# Patient Record
Sex: FEMALE | Race: BLACK OR AFRICAN AMERICAN | NOT HISPANIC OR LATINO | URBAN - METROPOLITAN AREA
[De-identification: names, ages, dates, MRNs, and addresses within clinical notes are randomized per-mention and may not be internally consistent; named-entity substitution may affect disease eponyms.]

---

## 2019-01-16 ENCOUNTER — INPATIENT (INPATIENT)
Facility: HOSPITAL | Age: 39
LOS: 8 days | Discharge: HOME | End: 2019-01-25
Attending: INTERNAL MEDICINE | Admitting: INTERNAL MEDICINE
Payer: COMMERCIAL

## 2019-01-16 VITALS
SYSTOLIC BLOOD PRESSURE: 108 MMHG | HEART RATE: 91 BPM | TEMPERATURE: 98 F | OXYGEN SATURATION: 99 % | WEIGHT: 121.92 LBS | HEIGHT: 64 IN | RESPIRATION RATE: 19 BRPM | DIASTOLIC BLOOD PRESSURE: 63 MMHG

## 2019-01-16 DIAGNOSIS — J15.6 PNEUMONIA DUE TO OTHER GRAM-NEGATIVE BACTERIA: ICD-10-CM

## 2019-01-16 DIAGNOSIS — D72.820 LYMPHOCYTOSIS (SYMPTOMATIC): ICD-10-CM

## 2019-01-16 DIAGNOSIS — J18.9 PNEUMONIA, UNSPECIFIED ORGANISM: ICD-10-CM

## 2019-01-16 DIAGNOSIS — A41.59 OTHER GRAM-NEGATIVE SEPSIS: ICD-10-CM

## 2019-01-16 DIAGNOSIS — D72.829 ELEVATED WHITE BLOOD CELL COUNT, UNSPECIFIED: ICD-10-CM

## 2019-01-16 DIAGNOSIS — D47.9 NEOPLASM OF UNCERTAIN BEHAVIOR OF LYMPHOID, HEMATOPOIETIC AND RELATED TISSUE, UNSPECIFIED: ICD-10-CM

## 2019-01-16 DIAGNOSIS — R19.7 DIARRHEA, UNSPECIFIED: ICD-10-CM

## 2019-01-16 LAB
ALBUMIN SERPL ELPH-MCNC: 2.7 G/DL — LOW (ref 3.3–5)
ALP SERPL-CCNC: 109 U/L — SIGNIFICANT CHANGE UP (ref 40–120)
ALT FLD-CCNC: 27 U/L — SIGNIFICANT CHANGE UP (ref 12–78)
ANION GAP SERPL CALC-SCNC: 8 MMOL/L — SIGNIFICANT CHANGE UP (ref 5–17)
ANISOCYTOSIS BLD QL: SLIGHT — SIGNIFICANT CHANGE UP
APTT BLD: 30.6 SEC — SIGNIFICANT CHANGE UP (ref 28.5–37)
AST SERPL-CCNC: 42 U/L — HIGH (ref 15–37)
BASOPHILS # BLD AUTO: 0 K/UL — SIGNIFICANT CHANGE UP (ref 0–0.2)
BASOPHILS NFR BLD AUTO: 0 % — SIGNIFICANT CHANGE UP (ref 0–2)
BILIRUB SERPL-MCNC: 0.2 MG/DL — SIGNIFICANT CHANGE UP (ref 0.2–1.2)
BUN SERPL-MCNC: 15 MG/DL — SIGNIFICANT CHANGE UP (ref 7–23)
CALCIUM SERPL-MCNC: 8.8 MG/DL — SIGNIFICANT CHANGE UP (ref 8.5–10.1)
CHLORIDE SERPL-SCNC: 107 MMOL/L — SIGNIFICANT CHANGE UP (ref 96–108)
CK MB CFR SERPL CALC: 3 NG/ML — SIGNIFICANT CHANGE UP (ref 0.5–3.6)
CO2 SERPL-SCNC: 26 MMOL/L — SIGNIFICANT CHANGE UP (ref 22–31)
CREAT SERPL-MCNC: 0.91 MG/DL — SIGNIFICANT CHANGE UP (ref 0.5–1.3)
D DIMER BLD IA.RAPID-MCNC: <150 NG/ML DDU — SIGNIFICANT CHANGE UP
EOSINOPHIL # BLD AUTO: 1.01 K/UL — HIGH (ref 0–0.5)
EOSINOPHIL NFR BLD AUTO: 4 % — SIGNIFICANT CHANGE UP (ref 0–6)
FLU A RESULT: SIGNIFICANT CHANGE UP
FLU A RESULT: SIGNIFICANT CHANGE UP
FLUAV AG NPH QL: SIGNIFICANT CHANGE UP
FLUBV AG NPH QL: SIGNIFICANT CHANGE UP
GLUCOSE SERPL-MCNC: 79 MG/DL — SIGNIFICANT CHANGE UP (ref 70–99)
HCG SERPL-ACNC: <1 MIU/ML — SIGNIFICANT CHANGE UP
HCT VFR BLD CALC: 40 % — SIGNIFICANT CHANGE UP (ref 34.5–45)
HGB BLD-MCNC: 13.1 G/DL — SIGNIFICANT CHANGE UP (ref 11.5–15.5)
INR BLD: 1.18 RATIO — HIGH (ref 0.88–1.16)
LACTATE SERPL-SCNC: 0.7 MMOL/L — SIGNIFICANT CHANGE UP (ref 0.7–2)
LG PLATELETS BLD QL AUTO: SLIGHT — SIGNIFICANT CHANGE UP
LYMPHOCYTES # BLD AUTO: 16.94 K/UL — HIGH (ref 1–3.3)
LYMPHOCYTES # BLD AUTO: 67 % — HIGH (ref 13–44)
MACROCYTES BLD QL: SLIGHT — SIGNIFICANT CHANGE UP
MANUAL DIF COMMENT BLD-IMP: SIGNIFICANT CHANGE UP
MANUAL SMEAR VERIFICATION: SIGNIFICANT CHANGE UP
MCHC RBC-ENTMCNC: 28.1 PG — SIGNIFICANT CHANGE UP (ref 27–34)
MCHC RBC-ENTMCNC: 32.8 GM/DL — SIGNIFICANT CHANGE UP (ref 32–36)
MCV RBC AUTO: 85.8 FL — SIGNIFICANT CHANGE UP (ref 80–100)
MICROCYTES BLD QL: SLIGHT — SIGNIFICANT CHANGE UP
MONOCYTES # BLD AUTO: 1.26 K/UL — HIGH (ref 0–0.9)
MONOCYTES NFR BLD AUTO: 5 % — SIGNIFICANT CHANGE UP (ref 2–14)
NEUTROPHILS # BLD AUTO: 4.55 K/UL — SIGNIFICANT CHANGE UP (ref 1.8–7.4)
NEUTROPHILS NFR BLD AUTO: 18 % — LOW (ref 43–77)
NRBC # BLD: 0 /100 — SIGNIFICANT CHANGE UP (ref 0–0)
NRBC # BLD: SIGNIFICANT CHANGE UP /100 WBCS (ref 0–0)
NT-PROBNP SERPL-SCNC: 23 PG/ML — SIGNIFICANT CHANGE UP (ref 0–125)
OVALOCYTES BLD QL SMEAR: SLIGHT — SIGNIFICANT CHANGE UP
PLAT MORPH BLD: NORMAL — SIGNIFICANT CHANGE UP
PLATELET # BLD AUTO: 424 K/UL — HIGH (ref 150–400)
POLYCHROMASIA BLD QL SMEAR: SLIGHT — SIGNIFICANT CHANGE UP
POTASSIUM SERPL-MCNC: 3.9 MMOL/L — SIGNIFICANT CHANGE UP (ref 3.5–5.3)
POTASSIUM SERPL-SCNC: 3.9 MMOL/L — SIGNIFICANT CHANGE UP (ref 3.5–5.3)
PROT SERPL-MCNC: 7.5 GM/DL — SIGNIFICANT CHANGE UP (ref 6–8.3)
PROTHROM AB SERPL-ACNC: 13.3 SEC — HIGH (ref 10–12.9)
RBC # BLD: 4.66 M/UL — SIGNIFICANT CHANGE UP (ref 3.8–5.2)
RBC # FLD: 11.9 % — SIGNIFICANT CHANGE UP (ref 10.3–14.5)
RBC BLD AUTO: ABNORMAL
RSV RESULT: SIGNIFICANT CHANGE UP
RSV RNA RESP QL NAA+PROBE: SIGNIFICANT CHANGE UP
SMUDGE CELLS # BLD: PRESENT — SIGNIFICANT CHANGE UP
SODIUM SERPL-SCNC: 141 MMOL/L — SIGNIFICANT CHANGE UP (ref 135–145)
TROPONIN I SERPL-MCNC: <.015 NG/ML — SIGNIFICANT CHANGE UP (ref 0.01–0.04)
VARIANT LYMPHS # BLD: 6 % — SIGNIFICANT CHANGE UP (ref 0–6)
WBC # BLD: 25.28 K/UL — HIGH (ref 3.8–10.5)
WBC # FLD AUTO: 25.28 K/UL — HIGH (ref 3.8–10.5)

## 2019-01-16 PROCEDURE — 99223 1ST HOSP IP/OBS HIGH 75: CPT

## 2019-01-16 PROCEDURE — 99285 EMERGENCY DEPT VISIT HI MDM: CPT

## 2019-01-16 PROCEDURE — 88112 CYTOPATH CELL ENHANCE TECH: CPT | Mod: 26

## 2019-01-16 PROCEDURE — 71275 CT ANGIOGRAPHY CHEST: CPT | Mod: 26

## 2019-01-16 PROCEDURE — 93010 ELECTROCARDIOGRAM REPORT: CPT

## 2019-01-16 PROCEDURE — 71045 X-RAY EXAM CHEST 1 VIEW: CPT | Mod: 26

## 2019-01-16 RX ORDER — SODIUM CHLORIDE 9 MG/ML
2000 INJECTION INTRAMUSCULAR; INTRAVENOUS; SUBCUTANEOUS ONCE
Qty: 0 | Refills: 0 | Status: COMPLETED | OUTPATIENT
Start: 2019-01-16 | End: 2019-01-16

## 2019-01-16 RX ORDER — PIPERACILLIN AND TAZOBACTAM 4; .5 G/20ML; G/20ML
3.38 INJECTION, POWDER, LYOPHILIZED, FOR SOLUTION INTRAVENOUS ONCE
Qty: 0 | Refills: 0 | Status: COMPLETED | OUTPATIENT
Start: 2019-01-16 | End: 2019-01-16

## 2019-01-16 RX ORDER — VANCOMYCIN HCL 1 G
1000 VIAL (EA) INTRAVENOUS ONCE
Qty: 0 | Refills: 0 | Status: COMPLETED | OUTPATIENT
Start: 2019-01-16 | End: 2019-01-16

## 2019-01-16 RX ORDER — PIPERACILLIN AND TAZOBACTAM 4; .5 G/20ML; G/20ML
3.38 INJECTION, POWDER, LYOPHILIZED, FOR SOLUTION INTRAVENOUS EVERY 8 HOURS
Qty: 0 | Refills: 0 | Status: DISCONTINUED | OUTPATIENT
Start: 2019-01-16 | End: 2019-01-17

## 2019-01-16 RX ORDER — ACETAMINOPHEN 500 MG
650 TABLET ORAL EVERY 6 HOURS
Qty: 0 | Refills: 0 | Status: DISCONTINUED | OUTPATIENT
Start: 2019-01-16 | End: 2019-01-23

## 2019-01-16 RX ADMIN — Medication 250 MILLIGRAM(S): at 17:12

## 2019-01-16 RX ADMIN — Medication 650 MILLIGRAM(S): at 21:19

## 2019-01-16 RX ADMIN — SODIUM CHLORIDE 2000 MILLILITER(S): 9 INJECTION INTRAMUSCULAR; INTRAVENOUS; SUBCUTANEOUS at 12:20

## 2019-01-16 RX ADMIN — Medication 650 MILLIGRAM(S): at 20:19

## 2019-01-16 RX ADMIN — SODIUM CHLORIDE 2000 MILLILITER(S): 9 INJECTION INTRAMUSCULAR; INTRAVENOUS; SUBCUTANEOUS at 16:31

## 2019-01-16 RX ADMIN — PIPERACILLIN AND TAZOBACTAM 25 GRAM(S): 4; .5 INJECTION, POWDER, LYOPHILIZED, FOR SOLUTION INTRAVENOUS at 21:29

## 2019-01-16 RX ADMIN — PIPERACILLIN AND TAZOBACTAM 200 GRAM(S): 4; .5 INJECTION, POWDER, LYOPHILIZED, FOR SOLUTION INTRAVENOUS at 16:30

## 2019-01-16 NOTE — ED PROVIDER NOTE - PROGRESS NOTE DETAILS
Pt is alert and oriented x 3 ambulate with normal gaits without assists. Dr. Howard is notified and admit pt.

## 2019-01-16 NOTE — H&P ADULT - PROBLEM SELECTOR PROBLEM 1
Pneumonia of both lungs due to infectious organism, unspecified part of lung Pneumonia of both lungs due to other aerobic gram-negative bacteria

## 2019-01-16 NOTE — ED PROVIDER NOTE - OBJECTIVE STATEMENT
38 years old female walked in c/o sob, dry cough bilateral chest pain and headache for two weeks. Pt denies recent long traveling or trauma, hx of taking hormone therapy, dizziness, blurred visions, light sensitivities, neck/back pain, focal/distal weakness or numbness, nausea, vomiting, fever, chills, abd pain, dysuria, hematuria, vaginal spotting or discharge or irregular bowel movements. Pt sts she is not at risk of being pregnant.

## 2019-01-16 NOTE — ED ADULT TRIAGE NOTE - CHIEF COMPLAINT QUOTE
patient c/o of difficulty breathing and chest pain , started 2 weeks ago , patient c/o of headache , denied dizziness , denied weakness , denied back pain

## 2019-01-16 NOTE — H&P ADULT - ASSESSMENT
38M WITH NO PMH with pneumonia     IMPROVE VTE Individual Risk Assessment          RISK                                                          Points  [  ] Previous VTE                                                3  [  ] Thrombophilia                                             2  [  ] Lower limb paralysis                                   2        (unable to hold up >15 seconds)    [  ] Current Cancer                                             2         (within 6 months)  [  ] Immobilization > 24 hrs                              1  [  ] ICU/CCU stay > 24 hours                             1  [  ] Age > 60                                                         1    IMPROVE VTE Score:         [         0]    Total Risk Factor Score:    0 - 1:   Consider IPC  >2 - 3:  Thromboprophylaxis required (enoxaparin or SQ heparin)        >4:   High Risk: Thromboprophylaxis required (enoxaparin or SQ heparin), optional add IPC  **If CONTRAINDICATION to enoxaparin or SQ heparin, USE IPCs**

## 2019-01-16 NOTE — H&P ADULT - NSHPLABSRESULTS_GEN_ALL_CORE
13.1   25.28 )-----------( 424      ( 16 Jan 2019 12:47 )             40.0   01-16    141  |  107  |  15  ----------------------------<  79  3.9   |  26  |  0.91    Ca    8.8      16 Jan 2019 12:47    TPro  7.5  /  Alb  2.7<L>  /  TBili  0.2  /  DBili  x   /  AST  42<H>  /  ALT  27  /  AlkPhos  109  01-16  < from: CT Angio Chest w/ IV Cont (01.16.19 @ 14:10) >      Patchy airspace opacifications inboth lungs may represent pneumonia.   Clinical correction is recommended.    Nonspecific mildly enlarged right hilar lymph node.    < from: Xray Chest 1 View AP/PA. (01.16.19 @ 11:51) >    FINDINGS:        Lungs: The lungs are clear.  Heart: The heart is normal in size.  Mediastinum: The mediastinum is within normal limits.    IMPRESSION:    Clear lungs.    < end of copied text >

## 2019-01-16 NOTE — H&P ADULT - NSHPPHYSICALEXAM_GEN_ALL_CORE
ICU Vital Signs Last 24 Hrs  T(C): 37.1 (16 Jan 2019 11:44), Max: 37.1 (16 Jan 2019 11:44)  T(F): 98.7 (16 Jan 2019 11:44), Max: 98.7 (16 Jan 2019 11:44)  HR: 98 (16 Jan 2019 11:44) (91 - 98)  BP: 113/72 (16 Jan 2019 11:44) (108/63 - 113/72)  BP(mean): --  ABP: --  ABP(mean): --  RR: 18 (16 Jan 2019 11:44) (18 - 19)  SpO2: 99% (16 Jan 2019 11:44) (99% - 99%)      GENERAL: NAD well-developed  HEAD:  Atraumatic, Normocephalic  EYES: EOMI, PERRLA, conjunctiva and sclera clear  ENMT: No tonsillar erythema, exudates, or enlargement; Moist mucous membranes, Good dentition, No lesions  NECK: Supple, No JVD, Normal thyroid  NERVOUS SYSTEM:  Alert & Oriented X3, Good concentration; Motor Strength 5/5 B/L upper and lower extremities; DTRs 2+ intact and symmetric  CHEST/LUNG: Clear to percussion bilaterally; No rales, rhonchi, wheezing, or rubs  HEART: Regular rate and rhythm; No murmurs, rubs, or gallops  ABDOMEN: Soft, Nontender, Nondistended; Bowel sounds present  EXTREMITIES:  2+ Peripheral Pulses, No clubbing, cyanosis, or edema  LYMPH: No lymphadenopathy   SKIN: No rashes or lesions

## 2019-01-16 NOTE — ED PROVIDER NOTE - CONSTITUTIONAL, MLM
normal... Well appearing, well nourished, awake, alert, oriented to person, place, time/situation and in no apparent distress. Speaking in clear full sentences no nasal flaring no shoulders retractions no diaphoresis, not holding her head/chest/abdomen, appears comfortable sitting up in the stretcher in a bright light room.

## 2019-01-16 NOTE — H&P ADULT - HISTORY OF PRESENT ILLNESS
38 years old female walked in c/o sob, dry cough bilateral chest pain and headache for two weeks. Pt denies recent long traveling or trauma, hx of taking hormone therapy, dizziness, blurred visions, light sensitivities, neck/back pain, focal/distal weakness or numbness, nausea, vomiting, fever, chills, abd pain, dysuria, hematuria, vaginal spotting or discharge or irregular bowel movements. Pt sts she is not at risk of being pregnant. 38 years old female walked in c/o sob, dry cough bilateral chest pain and headache for two weeks patient cough is nonproducticve and with asome pleuritic chest pain and short of breath - patient went to urgent center and they gave her naproxen  Pt denies recent long traveling or trauma, hx of taking hormone therapy, dizziness, blurred visions, light sensitivities, neck/back pain, focal/distal weakness or numbness, nausea, vomiting,, chills, abd pain, dysuria, hematuria, vaginal spotting or discharge or irregular bowel movements. Pt sts she is not at risk of being pregnant.

## 2019-01-16 NOTE — ED ADULT NURSE NOTE - OBJECTIVE STATEMENT
Pt is a 38YOF who is here for pain in her chest. Pt is not in any distress, pt states the pain started about 2 weeks ago and has only gotten worse. Pt states that she has a problem inhaling when she tries to breathe deep

## 2019-01-17 LAB
ANION GAP SERPL CALC-SCNC: 10 MMOL/L — SIGNIFICANT CHANGE UP (ref 5–17)
BUN SERPL-MCNC: 11 MG/DL — SIGNIFICANT CHANGE UP (ref 7–23)
CALCIUM SERPL-MCNC: 8 MG/DL — LOW (ref 8.5–10.1)
CHLORIDE SERPL-SCNC: 110 MMOL/L — HIGH (ref 96–108)
CO2 SERPL-SCNC: 23 MMOL/L — SIGNIFICANT CHANGE UP (ref 22–31)
CREAT SERPL-MCNC: 1.08 MG/DL — SIGNIFICANT CHANGE UP (ref 0.5–1.3)
GLUCOSE SERPL-MCNC: 94 MG/DL — SIGNIFICANT CHANGE UP (ref 70–99)
HCT VFR BLD CALC: 38 % — SIGNIFICANT CHANGE UP (ref 34.5–45)
HGB BLD-MCNC: 12.3 G/DL — SIGNIFICANT CHANGE UP (ref 11.5–15.5)
MCHC RBC-ENTMCNC: 28 PG — SIGNIFICANT CHANGE UP (ref 27–34)
MCHC RBC-ENTMCNC: 32.4 GM/DL — SIGNIFICANT CHANGE UP (ref 32–36)
MCV RBC AUTO: 86.4 FL — SIGNIFICANT CHANGE UP (ref 80–100)
NRBC # BLD: 0 /100 WBCS — SIGNIFICANT CHANGE UP (ref 0–0)
PLATELET # BLD AUTO: 374 K/UL — SIGNIFICANT CHANGE UP (ref 150–400)
POTASSIUM SERPL-MCNC: 3.8 MMOL/L — SIGNIFICANT CHANGE UP (ref 3.5–5.3)
POTASSIUM SERPL-SCNC: 3.8 MMOL/L — SIGNIFICANT CHANGE UP (ref 3.5–5.3)
RBC # BLD: 4.4 M/UL — SIGNIFICANT CHANGE UP (ref 3.8–5.2)
RBC # FLD: 11.8 % — SIGNIFICANT CHANGE UP (ref 10.3–14.5)
SODIUM SERPL-SCNC: 143 MMOL/L — SIGNIFICANT CHANGE UP (ref 135–145)
WBC # BLD: 19.72 K/UL — HIGH (ref 3.8–10.5)
WBC # FLD AUTO: 19.72 K/UL — HIGH (ref 3.8–10.5)

## 2019-01-17 PROCEDURE — 99233 SBSQ HOSP IP/OBS HIGH 50: CPT

## 2019-01-17 PROCEDURE — 99253 IP/OBS CNSLTJ NEW/EST LOW 45: CPT

## 2019-01-17 RX ORDER — CEFEPIME 1 G/1
2000 INJECTION, POWDER, FOR SOLUTION INTRAMUSCULAR; INTRAVENOUS EVERY 8 HOURS
Qty: 0 | Refills: 0 | Status: DISCONTINUED | OUTPATIENT
Start: 2019-01-17 | End: 2019-01-23

## 2019-01-17 RX ORDER — AZITHROMYCIN 500 MG/1
500 TABLET, FILM COATED ORAL DAILY
Qty: 0 | Refills: 0 | Status: DISCONTINUED | OUTPATIENT
Start: 2019-01-17 | End: 2019-01-23

## 2019-01-17 RX ORDER — ENOXAPARIN SODIUM 100 MG/ML
40 INJECTION SUBCUTANEOUS DAILY
Qty: 0 | Refills: 0 | Status: DISCONTINUED | OUTPATIENT
Start: 2019-01-17 | End: 2019-01-23

## 2019-01-17 RX ADMIN — Medication 650 MILLIGRAM(S): at 16:33

## 2019-01-17 RX ADMIN — CEFEPIME 100 MILLIGRAM(S): 1 INJECTION, POWDER, FOR SOLUTION INTRAMUSCULAR; INTRAVENOUS at 22:25

## 2019-01-17 RX ADMIN — PIPERACILLIN AND TAZOBACTAM 25 GRAM(S): 4; .5 INJECTION, POWDER, LYOPHILIZED, FOR SOLUTION INTRAVENOUS at 05:49

## 2019-01-17 RX ADMIN — ENOXAPARIN SODIUM 40 MILLIGRAM(S): 100 INJECTION SUBCUTANEOUS at 12:05

## 2019-01-17 RX ADMIN — AZITHROMYCIN 500 MILLIGRAM(S): 500 TABLET, FILM COATED ORAL at 15:18

## 2019-01-17 RX ADMIN — Medication 650 MILLIGRAM(S): at 15:36

## 2019-01-17 RX ADMIN — CEFEPIME 100 MILLIGRAM(S): 1 INJECTION, POWDER, FOR SOLUTION INTRAMUSCULAR; INTRAVENOUS at 15:17

## 2019-01-17 NOTE — PROGRESS NOTE ADULT - SUBJECTIVE AND OBJECTIVE BOX
Patient is a 38y old  Female who presents with a chief complaint of cold (16 Jan 2019 15:59)      OVERNIGHT EVENTS: none     REVIEW OF SYSTEMS: denies chest pain/SOB, diaphoresis, no F/C, cough, dizziness, headache, blurry vision, nausea, vomiting, abdominal pain. All others review of systems negative     MEDICATIONS  (STANDING):  enoxaparin Injectable 40 milliGRAM(s) SubCutaneous daily  piperacillin/tazobactam IVPB. 3.375 Gram(s) IV Intermittent every 8 hours    MEDICATIONS  (PRN):  acetaminophen   Tablet .. 650 milliGRAM(s) Oral every 6 hours PRN Temp greater or equal to 38C (100.4F), Mild Pain (1 - 3)      Allergies    No Known Allergies    Intolerances        T(F): 98.1 (01-17-19 @ 05:30), Max: 99.6 (01-16-19 @ 20:15)  HR: 83 (01-17-19 @ 05:30) (69 - 110)  BP: 109/50 (01-17-19 @ 05:30) (98/52 - 118/73)  RR: 17 (01-17-19 @ 05:30) (17 - 19)  SpO2: 98% (01-17-19 @ 05:30) (97% - 99%)  Wt(kg): --    PHYSICAL EXAM:  GENERAL: NAD, well-groomed, well-developed  HEAD:  Atraumatic, Normocephalic  EYES: EOMI, PERRLA, conjunctiva and sclera clear  ENMT: No tonsillar erythema, exudates, or enlargement; Moist mucous membranes, Good dentition, No lesions  NECK: Supple, No JVD, Normal thyroid  NERVOUS SYSTEM:  Alert & Oriented X3, Good concentration; Motor Strength 5/5 B/L upper and lower extremities; DTRs 2+ intact and symmetric  CHEST/LUNG: Clear to percussion bilaterally; No rales, rhonchi, wheezing, or rubs BL  HEART: Regular rate and rhythm; No murmurs, rubs, or gallops  ABDOMEN: Soft, Nontender, Nondistended; Bowel sounds present  EXTREMITIES:  2+ Peripheral Pulses, No clubbing, cyanosis, or edema BL LE  LYMPH: No lymphadenopathy noted  SKIN: No rashes or lesions    LABS:                        12.3   19.72 )-----------( 374      ( 17 Jan 2019 07:40 )             38.0     01-17    143  |  110<H>  |  11  ----------------------------<  94  3.8   |  23  |  1.08    Ca    8.0<L>      17 Jan 2019 07:40    TPro  7.5  /  Alb  2.7<L>  /  TBili  0.2  /  DBili  x   /  AST  42<H>  /  ALT  27  /  AlkPhos  109  01-16    PT/INR - ( 16 Jan 2019 12:47 )   PT: 13.3 sec;   INR: 1.18 ratio         PTT - ( 16 Jan 2019 12:47 )  PTT:30.6 sec    Cultures;   CAPILLARY BLOOD GLUCOSE        Lipid panel:     CARDIAC MARKERS ( 16 Jan 2019 12:47 )  <.015 ng/mL / x     / x     / x     / 3.0 ng/mL        RADIOLOGY & ADDITIONAL TESTS:    Imaging Personally Reviewed:  [ x] YES      Consultant(s) Notes Reviewed:  [x ] YES     Care Discussed with [x ] Consultants [X ] Patient [ ] Family  [x ]    [x ]  Other; RN

## 2019-01-17 NOTE — CONSULT NOTE ADULT - SUBJECTIVE AND OBJECTIVE BOX
Infectious Diseases - Attending at Dr. Rain    HPI:  38 years old female walked in c/o sob, dry cough bilateral chest pain and headache for two weeks patient cough is nonproducticve and with some pleuritic chest pain and short of breath - patient went to urgent center and they gave her naproxen .Pt denies recent long traveling or trauma, hx of taking hormone therapy, dizziness, blurred visions, light sensitivities, neck/back pain, focal/distal weakness or numbness, nausea, vomiting,, chills, abd pain, dysuria, hematuria, vaginal spotting or discharge or irregular bowel movements. Pt sts she is not at risk of being pregnant. (16 Jan 2019 15:59)  nNo exposure to kids/babies. Works with homeless people. On and off fever this week with T max 102.      PAST MEDICAL & SURGICAL HISTORY:  No pertinent past medical history  No significant past surgical history      Allergies    No Known Allergies    Intolerances        FAMILY HISTORY: non  contributory      SOCIAL HISTORY: non smoker    REVIEW OF SYSTEMS:    Constitutional: + fever, No  weight loss   Eyes: No eye pain, visual disturbances, or discharge  ENT:  No difficulty hearing, tinnitus, vertigo; No sinus or throat pain  Neck: No pain or stiffness  Respiratory: +dry cough, No  wheezing, chills or hemoptysis  Cardiovascular: No chest pain, palpitations, No  shortness of breath, dizziness or leg swelling  Gastrointestinal: No abdominal or epigastric pain. No nausea, vomiting or hematemesis; No diarrhea or constipation. No melena or hematochezia.  Genitourinary: No dysuria, frequency, hematuria or incontinence  Neurological: No headaches, memory loss, loss of strength, numbness or tremors  Skin: No itching, burning, rashes or lesions       MEDICATIONS  (STANDING):  azithromycin   Tablet 500 milliGRAM(s) Oral daily  cefepime   IVPB 2000 milliGRAM(s) IV Intermittent every 8 hours  enoxaparin Injectable 40 milliGRAM(s) SubCutaneous daily    MEDICATIONS  (PRN):  acetaminophen   Tablet .. 650 milliGRAM(s) Oral every 6 hours PRN Temp greater or equal to 38C (100.4F), Mild Pain (1 - 3)      Vital Signs Last 24 Hrs  T(F): 98.1 (01-17-19 @ 05:30), Max: 99.6 (01-16-19 @ 20:15)  HR: 83 (01-17-19 @ 05:30) (69 - 110)  BP: 109/50 (01-17-19 @ 05:30) (98/52 - 118/73)  RR: 17 (01-17-19 @ 05:30) (17 - 19)  SpO2: 98% (01-17-19 @ 05:30) (97% - 99%)    PHYSICAL EXAM:    Constitutional: NAD, well-groomed, well-developed  HEENT: PERRLA, EOMI, Normal Hearing, MMM  Neck: No LAD, No JVD  Back: Normal spine flexure, No CVA tenderness  Respiratory: CTAB/L  Cardiovascular: S1 and S2, RRR, no M/G/R  Gastrointestinal: BS+, soft, NT/ND  Extremities: No peripheral edema  Vascular: 2+ peripheral pulses  Neurological: A/O x 3, no focal deficits  Skin: No rashes      LABS:                           12.3   19.72 )-----------( 374      ( 17 Jan 2019 07:40 )             38.0     01-17    143  |  110<H>  |  11  ----------------------------<  94  3.8   |  23  |  1.08    Ca    8.0<L>      17 Jan 2019 07:40    TPro  7.5  /  Alb  2.7<L>  /  TBili  0.2  /  DBili  x   /  AST  42<H>  /  ALT  27  /  AlkPhos  109  01-16    PT/INR - ( 16 Jan 2019 12:47 )   PT: 13.3 sec;   INR: 1.18 ratio         PTT - ( 16 Jan 2019 12:47 )  PTT:30.6 sec        MICROBIOLOGY:  RECENT CULTURES:  01-17 .Blood Blood-Peripheral XXXX XXXX   No growth to date.          RADIOLOGY & ADDITIONAL STUDIES:  Findings: No evidence for aortic dissection, or aneurysm. No suspicious   filling defect is identified in the pulmonary arteries to suggest   pulmonary embolism.    No pleural or pericardial effusion. The heart is not enlarged. No   enlarged mediastinal, or axillary lymph node. Nonspecific mildly enlarged   1.4 cm right hilar lymph node.    The trachea and central bronchi appear grossly unremarkable.    No evidence for pneumothorax. Patchy airspace opacifications in both lung   for which clinical correlation with pneumonia is recommended.    Limited sections through the upper abdomen demonstrate nonspecific mild   left adrenal thickening.    Impression: No evidence for pulmonary embolism.     Patchy airspace opacifications inboth lungs may represent pneumonia.   Clinical correction is recommended.    Nonspecific mildly enlarged right hilar lymph node.

## 2019-01-17 NOTE — PROGRESS NOTE ADULT - ASSESSMENT
38M with NO PMH walked in c/o sob, dry cough bilateral chest pain and headache for two weeks patient cough is nonproductive and with some pleuritic chest pain and short of breath admitted with pneumonia. < from: CT Angio Chest w/ IV Cont (01.16.19 @ 14:10) >No evidence for pulmonary embolism. Patchy airspace opacifications inboth lungs may represent pneumonia. Clinical correction is recommended. Nonspecific mildly enlarged right hilar lymph node.

## 2019-01-18 DIAGNOSIS — A41.9 SEPSIS, UNSPECIFIED ORGANISM: ICD-10-CM

## 2019-01-18 LAB
ANION GAP SERPL CALC-SCNC: 9 MMOL/L — SIGNIFICANT CHANGE UP (ref 5–17)
BUN SERPL-MCNC: 11 MG/DL — SIGNIFICANT CHANGE UP (ref 7–23)
C DIFF BY PCR RESULT: SIGNIFICANT CHANGE UP
C DIFF TOX GENS STL QL NAA+PROBE: SIGNIFICANT CHANGE UP
CALCIUM SERPL-MCNC: 8.7 MG/DL — SIGNIFICANT CHANGE UP (ref 8.5–10.1)
CHLORIDE SERPL-SCNC: 110 MMOL/L — HIGH (ref 96–108)
CO2 SERPL-SCNC: 22 MMOL/L — SIGNIFICANT CHANGE UP (ref 22–31)
CREAT SERPL-MCNC: 1.08 MG/DL — SIGNIFICANT CHANGE UP (ref 0.5–1.3)
GLUCOSE SERPL-MCNC: 81 MG/DL — SIGNIFICANT CHANGE UP (ref 70–99)
HCT VFR BLD CALC: 41.2 % — SIGNIFICANT CHANGE UP (ref 34.5–45)
HGB BLD-MCNC: 13.4 G/DL — SIGNIFICANT CHANGE UP (ref 11.5–15.5)
MCHC RBC-ENTMCNC: 28 PG — SIGNIFICANT CHANGE UP (ref 27–34)
MCHC RBC-ENTMCNC: 32.5 GM/DL — SIGNIFICANT CHANGE UP (ref 32–36)
MCV RBC AUTO: 86.2 FL — SIGNIFICANT CHANGE UP (ref 80–100)
NRBC # BLD: 0 /100 WBCS — SIGNIFICANT CHANGE UP (ref 0–0)
PLATELET # BLD AUTO: 361 K/UL — SIGNIFICANT CHANGE UP (ref 150–400)
POTASSIUM SERPL-MCNC: 3.7 MMOL/L — SIGNIFICANT CHANGE UP (ref 3.5–5.3)
POTASSIUM SERPL-SCNC: 3.7 MMOL/L — SIGNIFICANT CHANGE UP (ref 3.5–5.3)
PROCALCITONIN SERPL-MCNC: 0.02 NG/ML — SIGNIFICANT CHANGE UP (ref 0.02–0.1)
RBC # BLD: 4.78 M/UL — SIGNIFICANT CHANGE UP (ref 3.8–5.2)
RBC # FLD: 11.9 % — SIGNIFICANT CHANGE UP (ref 10.3–14.5)
SODIUM SERPL-SCNC: 141 MMOL/L — SIGNIFICANT CHANGE UP (ref 135–145)
WBC # BLD: 24.11 K/UL — HIGH (ref 3.8–10.5)
WBC # FLD AUTO: 24.11 K/UL — HIGH (ref 3.8–10.5)

## 2019-01-18 PROCEDURE — 99233 SBSQ HOSP IP/OBS HIGH 50: CPT

## 2019-01-18 RX ADMIN — Medication 650 MILLIGRAM(S): at 21:29

## 2019-01-18 RX ADMIN — CEFEPIME 100 MILLIGRAM(S): 1 INJECTION, POWDER, FOR SOLUTION INTRAMUSCULAR; INTRAVENOUS at 22:00

## 2019-01-18 RX ADMIN — CEFEPIME 100 MILLIGRAM(S): 1 INJECTION, POWDER, FOR SOLUTION INTRAMUSCULAR; INTRAVENOUS at 06:16

## 2019-01-18 RX ADMIN — Medication 650 MILLIGRAM(S): at 11:08

## 2019-01-18 RX ADMIN — Medication 650 MILLIGRAM(S): at 22:30

## 2019-01-18 RX ADMIN — ENOXAPARIN SODIUM 40 MILLIGRAM(S): 100 INJECTION SUBCUTANEOUS at 11:08

## 2019-01-18 RX ADMIN — AZITHROMYCIN 500 MILLIGRAM(S): 500 TABLET, FILM COATED ORAL at 11:08

## 2019-01-18 RX ADMIN — Medication 650 MILLIGRAM(S): at 12:00

## 2019-01-18 RX ADMIN — CEFEPIME 100 MILLIGRAM(S): 1 INJECTION, POWDER, FOR SOLUTION INTRAMUSCULAR; INTRAVENOUS at 13:01

## 2019-01-18 NOTE — PROGRESS NOTE ADULT - PROBLEM SELECTOR PLAN 1
zosyn, tylenol, follow up bcx  infectious disease on board fungitell, legionella, streptococcal antigen, tylenol, follow up bcx  infectious disease on board  azithromycin   Tablet 500 milliGRAM(s) Oral daily  cefepime   IVPB 2000 milliGRAM(s) IV Intermittent every 8 hours

## 2019-01-18 NOTE — PROGRESS NOTE ADULT - SUBJECTIVE AND OBJECTIVE BOX
Patient is a 38y old  Female who presents with a chief complaint of cold (19 Jan 2019 09:59)      INTERVAL HPI / OVERNIGHT EVENTS: breathing improving    MEDICATIONS  (STANDING):  azithromycin   Tablet 500 milliGRAM(s) Oral daily  cefepime   IVPB 2000 milliGRAM(s) IV Intermittent every 8 hours  enoxaparin Injectable 40 milliGRAM(s) SubCutaneous daily  saccharomyces boulardii 250 milliGRAM(s) Oral two times a day    MEDICATIONS  (PRN):  acetaminophen   Tablet .. 650 milliGRAM(s) Oral every 6 hours PRN Temp greater or equal to 38C (100.4F), Mild Pain (1 - 3)  loperamide 2 milliGRAM(s) Oral every 6 hours PRN Diarrhea      Vital Signs Last 24 Hrs  Tmax: afebrile    Review of systems:  General : no fever /chills.fatigue  CVS : no chest pain ,palpitations  Lungs : + shortness of breath,cough  GI : no abdominal pain,vomiting,diarrhea   : no dysuria,hematuria        PHYSICAL EXAM:  General :NAD  Constitutional:  well-groomed, well-developed  Respiratory: CTAB/L  Cardiovascular: S1 and S2, RRR, no M/G/R  Gastrointestinal: BS+, soft, NT/ND  Extremities: No peripheral edema  Vascular: 2+ peripheral pulses  Skin: No rashes      LABS:             WBC 19-->24  PCT <0,05        MICROBIOLOGY:  RECENT CULTURES:  01-17 .Blood Blood-Peripheral XXXX XXXX   No growth to date.          RADIOLOGY & ADDITIONAL STUDIES:

## 2019-01-18 NOTE — PROGRESS NOTE ADULT - PROBLEM SELECTOR PLAN 1
Breathing better  but leukocytosis worsened   still waiting on HIV/fungitell/strept and legionella antigen  rec pulmonology consult  cont cefepime and azithro

## 2019-01-18 NOTE — PROGRESS NOTE ADULT - PROBLEM SELECTOR PLAN 2
zosyn, tylenol, follow up bcx  infectious disease on board POA  zosyn, tylenol, follow up bcx  infectious disease on board

## 2019-01-18 NOTE — PROGRESS NOTE ADULT - SUBJECTIVE AND OBJECTIVE BOX
Patient is a 38y old  Female who presents with a chief complaint of cold (17 Jan 2019 11:06)      OVERNIGHT EVENTS: DOMINGUEZ, SOB, mild cough    REVIEW OF SYSTEMS: denies chest pain, diaphoresis, no F/C, dizziness, headache, blurry vision, nausea, vomiting, abdominal pain. All others review of systems negative     MEDICATIONS  (STANDING):  azithromycin   Tablet 500 milliGRAM(s) Oral daily  cefepime   IVPB 2000 milliGRAM(s) IV Intermittent every 8 hours  enoxaparin Injectable 40 milliGRAM(s) SubCutaneous daily    MEDICATIONS  (PRN):  acetaminophen   Tablet .. 650 milliGRAM(s) Oral every 6 hours PRN Temp greater or equal to 38C (100.4F), Mild Pain (1 - 3)      Allergies    No Known Allergies    Intolerances        T(F): 98.7 (01-18-19 @ 05:32), Max: 99.2 (01-17-19 @ 12:01)  HR: 99 (01-18-19 @ 05:32) (89 - 102)  BP: 104/65 (01-18-19 @ 05:32) (99/66 - 111/72)  RR: 16 (01-18-19 @ 05:32) (16 - 18)  SpO2: 94% (01-18-19 @ 05:32) (94% - 98%)  Wt(kg): --    PHYSICAL EXAM:  GENERAL: NAD, well-groomed, well-developed  HEAD:  Atraumatic, Normocephalic  EYES: EOMI, PERRLA, conjunctiva and sclera clear  ENMT: No tonsillar erythema, exudates, or enlargement; Moist mucous membranes, Good dentition, No lesions  NECK: Supple, No JVD, Normal thyroid  NERVOUS SYSTEM:  Alert & Oriented X3, Good concentration; Motor Strength 5/5 B/L upper and lower extremities; DTRs 2+ intact and symmetric  CHEST/LUNG: Clear to percussion bilaterally; No rales, rhonchi, wheezing, or rubs BL  HEART: Regular rate and rhythm; No murmurs, rubs, or gallops  ABDOMEN: Soft, Nontender, Nondistended; Bowel sounds present  EXTREMITIES:  2+ Peripheral Pulses, No clubbing, cyanosis, or edema BL LE  LYMPH: No lymphadenopathy noted  SKIN: No rashes or lesions    LABS:                        13.4   24.11 )-----------( 361      ( 18 Jan 2019 07:53 )             41.2     01-18    141  |  110<H>  |  11  ----------------------------<  81  3.7   |  22  |  1.08    Ca    8.7      18 Jan 2019 07:53    TPro  7.5  /  Alb  2.7<L>  /  TBili  0.2  /  DBili  x   /  AST  42<H>  /  ALT  27  /  AlkPhos  109  01-16    PT/INR - ( 16 Jan 2019 12:47 )   PT: 13.3 sec;   INR: 1.18 ratio         PTT - ( 16 Jan 2019 12:47 )  PTT:30.6 sec    Cultures;   CAPILLARY BLOOD GLUCOSE        Lipid panel:     CARDIAC MARKERS ( 16 Jan 2019 12:47 )  <.015 ng/mL / x     / x     / x     / 3.0 ng/mL      RADIOLOGY & ADDITIONAL TESTS:    Imaging Personally Reviewed:  [ x] YES      Consultant(s) Notes Reviewed:  [x ] YES     Care Discussed with [x ] Consultants [X ] Patient [ ] Family  [x ]    [x ]  Other; RN

## 2019-01-18 NOTE — CONSULT NOTE ADULT - SUBJECTIVE AND OBJECTIVE BOX
Patient is a 38y old  Female who presents with a chief complaint of cold (18 Jan 2019 09:31)    HPI:  38 years old female walked in c/o sob, dry cough bilateral chest pain and headache for two weeks. Cough is non productive,  associated  with some  pleuritic chest pain and short of breath. Went to urgent center and they gave her naproxen  Denied  recent long traveling or trauma, hx of taking hormone therapy, dizziness, blurred visions, light sensitivities, neck/back pain, focal/distal weakness or numbness, nausea, vomiting,, chills, abd pain, dysuria, hematuria, vaginal spotting or discharge or irregular bowel movements. Pt sts she is not at risk of being pregnant.  Denies smoking but reports 2nd hand exposure from her partner.   Admitted with multifocal pneumonia and leukocytosis.    PAST MEDICAL & SURGICAL HISTORY:  No pertinent past medical history  No significant past surgical history    SOCIAL HISTORY: denies.    Allergies  No Known Allergies    MEDICATIONS  (STANDING):  azithromycin   Tablet 500 milliGRAM(s) Oral daily  cefepime   IVPB 2000 milliGRAM(s) IV Intermittent every 8 hours  enoxaparin Injectable 40 milliGRAM(s) SubCutaneous daily    MEDICATIONS  (PRN):  acetaminophen   Tablet .. 650 milliGRAM(s) Oral every 6 hours PRN Temp greater or equal to 38C (100.4F), Mild Pain (1 - 3)    Vital Signs Last 24 Hrs  T(C): 37.1 (18 Jan 2019 12:24), Max: 37.3 (17 Jan 2019 18:32)  T(F): 98.7 (18 Jan 2019 12:24), Max: 99.1 (17 Jan 2019 18:32)  HR: 97 (18 Jan 2019 12:24) (89 - 99)  BP: 103/67 (18 Jan 2019 12:24) (99/66 - 108/57)  BP(mean): --  RR: 17 (18 Jan 2019 12:24) (16 - 17)  SpO2: 98% (18 Jan 2019 12:24) (94% - 98%)    PHYSICAL EXAM:  GEN:         Awake, responsive and comfortable.  HEENT:    Normal.    RESP:        no wheezing.  CVS:          Regular rate and rhythm.   ABD:         Soft, non-tender, non-distended;   :             No costovertebral angle tenderness  SKIN:           Warm and dry.  EXTR:            No clubbing, cyanosis or edema  CNS:              Intact sensory and motor function.  PSYCH:        cooperative, no anxiety or depression    LABS:                        13.4   24.11 )-----------( 361      ( 18 Jan 2019 07:53 )             41.2     01-18    141  |  110<H>  |  11  ----------------------------<  81  3.7   |  22  |  1.08    Ca    8.7      18 Jan 2019 07:53    Culture - Blood (collected 01-17-19 @ 00:25)  Source: .Blood Blood-Peripheral  Preliminary Report (01-18-19 @ 01:04):    No growth to date.    Culture - Blood (collected 01-17-19 @ 00:25)  Source: .Blood Blood-Peripheral  Preliminary Report (01-18-19 @ 01:04):    No growth to date.    RADIOLOGY & ADDITIONAL STUDIES:  < from: CT Angio Chest w/ IV Cont (01.16.19 @ 14:10) >    EXAM:  CT ANGIO CHEST (W)AW IC                          PROCEDURE DATE:  01/16/2019      INTERPRETATION:  Pulmonary CTA    Indication: Dry cough, chest pain and shortness of breath.    Technique: Axial multidetector CT images of the chest areacquired from   the thoracic inlet to the upper abdomen following the administration of   IV contrast (75 cc Omnipaque-350, 25 cc discarded) according to our   pulmonary embolism protocol. Subsequent MIP is also reconstructed for   evaluation.    Comparison: None.    Findings: No evidence for aortic dissection, or aneurysm. No suspicious   filling defect is identified in the pulmonary arteries to suggest   pulmonary embolism.    No pleural or pericardial effusion. The heart is not enlarged. No   enlarged mediastinal, or axillary lymph node. Nonspecific mildly enlarged   1.4 cm right hilar lymph node.    The trachea and central bronchi appear grossly unremarkable.    No evidence for pneumothorax. Patchy airspace opacifications in both lung   for which clinical correlation with pneumonia is recommended.    Limited sections through the upper abdomen demonstrate nonspecific mild   left adrenal thickening.    Impression: No evidence for pulmonary embolism.     Patchy airspace opacifications inboth lungs may represent pneumonia.   Clinical correction is recommended.    Nonspecific mildly enlarged right hilar lymph node.    SLIME GARCÍA M.D., ATTENDING RADIOLOGIST  This document has been electronically signed. Jan 16 2019  2:31PM      ASSESSMENT AND PLAN:  ·	SOB.  ·	Multifocal Pneumonia.  ·	Leukocytosis.    CT reported with multifocal pneumonia but does not appear toxic.  will obtain vasculitis work up, ABG and LDH. Patient is a 38y old  Female who presents with a chief complaint of cold (18 Jan 2019 09:31)    HPI:  38 years old female walked in c/o sob, dry cough bilateral chest pain and headache for two weeks. Cough is non productive,  associated  with some  pleuritic chest pain and short of breath. Went to urgent center and they gave her naproxen  Denied  recent long traveling or trauma, hx of taking hormone therapy, dizziness, blurred visions, light sensitivities, neck/back pain, focal/distal weakness or numbness, nausea, vomiting,, chills, abd pain, dysuria, hematuria, vaginal spotting or discharge or irregular bowel movements. Pt sts she is not at risk of being pregnant.  Denies smoking but reports 2nd hand exposure from her partner.   Admitted with multifocal pneumonia and leukocytosis.    PAST MEDICAL & SURGICAL HISTORY:  No pertinent past medical history  No significant past surgical history    SOCIAL HISTORY: denies.    Allergies  No Known Allergies    MEDICATIONS  (STANDING):  azithromycin   Tablet 500 milliGRAM(s) Oral daily  cefepime   IVPB 2000 milliGRAM(s) IV Intermittent every 8 hours  enoxaparin Injectable 40 milliGRAM(s) SubCutaneous daily    MEDICATIONS  (PRN):  acetaminophen   Tablet .. 650 milliGRAM(s) Oral every 6 hours PRN Temp greater or equal to 38C (100.4F), Mild Pain (1 - 3)    Vital Signs Last 24 Hrs  T(C): 37.1 (18 Jan 2019 12:24), Max: 37.3 (17 Jan 2019 18:32)  T(F): 98.7 (18 Jan 2019 12:24), Max: 99.1 (17 Jan 2019 18:32)  HR: 97 (18 Jan 2019 12:24) (89 - 99)  BP: 103/67 (18 Jan 2019 12:24) (99/66 - 108/57)  BP(mean): --  RR: 17 (18 Jan 2019 12:24) (16 - 17)  SpO2: 98% (18 Jan 2019 12:24) (94% - 98%)    PHYSICAL EXAM:  GEN:         Awake, responsive and comfortable.  HEENT:    Normal.    RESP:        no wheezing.  CVS:          Regular rate and rhythm.   ABD:         Soft, non-tender, non-distended;   :             No costovertebral angle tenderness  SKIN:           Warm and dry.  EXTR:            No clubbing, cyanosis or edema  CNS:              Intact sensory and motor function.  PSYCH:        cooperative, no anxiety or depression    LABS:                        13.4   24.11 )-----------( 361      ( 18 Jan 2019 07:53 )             41.2     01-18    141  |  110<H>  |  11  ----------------------------<  81  3.7   |  22  |  1.08    Ca    8.7      18 Jan 2019 07:53    Culture - Blood (collected 01-17-19 @ 00:25)  Source: .Blood Blood-Peripheral  Preliminary Report (01-18-19 @ 01:04):    No growth to date.    Culture - Blood (collected 01-17-19 @ 00:25)  Source: .Blood Blood-Peripheral  Preliminary Report (01-18-19 @ 01:04):    No growth to date.    RADIOLOGY & ADDITIONAL STUDIES:  < from: CT Angio Chest w/ IV Cont (01.16.19 @ 14:10) >    EXAM:  CT ANGIO CHEST (W)AW IC                          PROCEDURE DATE:  01/16/2019      INTERPRETATION:  Pulmonary CTA    Indication: Dry cough, chest pain and shortness of breath.    Technique: Axial multidetector CT images of the chest areacquired from   the thoracic inlet to the upper abdomen following the administration of   IV contrast (75 cc Omnipaque-350, 25 cc discarded) according to our   pulmonary embolism protocol. Subsequent MIP is also reconstructed for   evaluation.    Comparison: None.    Findings: No evidence for aortic dissection, or aneurysm. No suspicious   filling defect is identified in the pulmonary arteries to suggest   pulmonary embolism.    No pleural or pericardial effusion. The heart is not enlarged. No   enlarged mediastinal, or axillary lymph node. Nonspecific mildly enlarged   1.4 cm right hilar lymph node.    The trachea and central bronchi appear grossly unremarkable.    No evidence for pneumothorax. Patchy airspace opacifications in both lung   for which clinical correlation with pneumonia is recommended.    Limited sections through the upper abdomen demonstrate nonspecific mild   left adrenal thickening.    Impression: No evidence for pulmonary embolism.     Patchy airspace opacifications inboth lungs may represent pneumonia.   Clinical correction is recommended.    Nonspecific mildly enlarged right hilar lymph node.    SLIME GARCÍA M.D., ATTENDING RADIOLOGIST  This document has been electronically signed. Jan 16 2019  2:31PM      ASSESSMENT AND PLAN:  ·	SOB.  ·	Multifocal Pneumonia.  ·	Leukocytosis.    CT reported with multifocal pneumonia but does not appear toxic.  will obtain vasculitis work up, ABG and LDH.  Follow HIV results.

## 2019-01-19 DIAGNOSIS — D72.829 ELEVATED WHITE BLOOD CELL COUNT, UNSPECIFIED: ICD-10-CM

## 2019-01-19 LAB
ANION GAP SERPL CALC-SCNC: 9 MMOL/L — SIGNIFICANT CHANGE UP (ref 5–17)
BASE EXCESS BLDA CALC-SCNC: -0.3 MMOL/L — SIGNIFICANT CHANGE UP (ref -2–2)
BLOOD GAS COMMENTS: SIGNIFICANT CHANGE UP
BLOOD GAS COMMENTS: SIGNIFICANT CHANGE UP
BLOOD GAS SOURCE: SIGNIFICANT CHANGE UP
BUN SERPL-MCNC: 14 MG/DL — SIGNIFICANT CHANGE UP (ref 7–23)
CALCIUM SERPL-MCNC: 9 MG/DL — SIGNIFICANT CHANGE UP (ref 8.5–10.1)
CHLORIDE SERPL-SCNC: 108 MMOL/L — SIGNIFICANT CHANGE UP (ref 96–108)
CO2 SERPL-SCNC: 22 MMOL/L — SIGNIFICANT CHANGE UP (ref 22–31)
CREAT SERPL-MCNC: 1.08 MG/DL — SIGNIFICANT CHANGE UP (ref 0.5–1.3)
CRP SERPL-MCNC: 2.92 MG/DL — HIGH (ref 0–0.4)
ERYTHROCYTE [SEDIMENTATION RATE] IN BLOOD: 71 MM/HR — HIGH (ref 0–15)
FUNGITELL: >500 PG/ML — HIGH
GLUCOSE SERPL-MCNC: 76 MG/DL — SIGNIFICANT CHANGE UP (ref 70–99)
HCO3 BLDA-SCNC: 22 MMOL/L — SIGNIFICANT CHANGE UP (ref 21–29)
HCT VFR BLD CALC: 41.8 % — SIGNIFICANT CHANGE UP (ref 34.5–45)
HGB BLD-MCNC: 14 G/DL — SIGNIFICANT CHANGE UP (ref 11.5–15.5)
HIV 1+2 AB+HIV1 P24 AG SERPL QL IA: SIGNIFICANT CHANGE UP
HOROWITZ INDEX BLDA+IHG-RTO: SIGNIFICANT CHANGE UP
LDH SERPL L TO P-CCNC: 615 U/L — HIGH (ref 50–242)
LEGIONELLA AG UR QL: NEGATIVE — SIGNIFICANT CHANGE UP
MCHC RBC-ENTMCNC: 28.6 PG — SIGNIFICANT CHANGE UP (ref 27–34)
MCHC RBC-ENTMCNC: 33.5 GM/DL — SIGNIFICANT CHANGE UP (ref 32–36)
MCV RBC AUTO: 85.3 FL — SIGNIFICANT CHANGE UP (ref 80–100)
NRBC # BLD: 0 /100 WBCS — SIGNIFICANT CHANGE UP (ref 0–0)
PCO2 BLDA: 32 MMHG — SIGNIFICANT CHANGE UP (ref 32–46)
PH BLD: 7.46 — HIGH (ref 7.35–7.45)
PLATELET # BLD AUTO: 359 K/UL — SIGNIFICANT CHANGE UP (ref 150–400)
PO2 BLDA: 93 MMHG — SIGNIFICANT CHANGE UP (ref 74–108)
POTASSIUM SERPL-MCNC: 4.1 MMOL/L — SIGNIFICANT CHANGE UP (ref 3.5–5.3)
POTASSIUM SERPL-SCNC: 4.1 MMOL/L — SIGNIFICANT CHANGE UP (ref 3.5–5.3)
RBC # BLD: 4.9 M/UL — SIGNIFICANT CHANGE UP (ref 3.8–5.2)
RBC # FLD: 11.9 % — SIGNIFICANT CHANGE UP (ref 10.3–14.5)
RHEUMATOID FACT SERPL-ACNC: <10 IU/ML — SIGNIFICANT CHANGE UP (ref 0–13)
S PNEUM AG SER QL: SIGNIFICANT CHANGE UP
SAO2 % BLDA: 97 % — HIGH (ref 92–96)
SODIUM SERPL-SCNC: 139 MMOL/L — SIGNIFICANT CHANGE UP (ref 135–145)
WBC # BLD: 21.99 K/UL — HIGH (ref 3.8–10.5)
WBC # FLD AUTO: 21.99 K/UL — HIGH (ref 3.8–10.5)

## 2019-01-19 PROCEDURE — 99233 SBSQ HOSP IP/OBS HIGH 50: CPT

## 2019-01-19 RX ORDER — SODIUM CHLORIDE 9 MG/ML
3 INJECTION INTRAMUSCULAR; INTRAVENOUS; SUBCUTANEOUS ONCE
Qty: 0 | Refills: 0 | Status: COMPLETED | OUTPATIENT
Start: 2019-01-20 | End: 2019-01-20

## 2019-01-19 RX ORDER — LOPERAMIDE HCL 2 MG
2 TABLET ORAL EVERY 6 HOURS
Qty: 0 | Refills: 0 | Status: DISCONTINUED | OUTPATIENT
Start: 2019-01-19 | End: 2019-01-23

## 2019-01-19 RX ORDER — SODIUM CHLORIDE 9 MG/ML
3 INJECTION INTRAMUSCULAR; INTRAVENOUS; SUBCUTANEOUS ONCE
Qty: 0 | Refills: 0 | Status: COMPLETED | OUTPATIENT
Start: 2019-01-19 | End: 2019-01-19

## 2019-01-19 RX ORDER — SACCHAROMYCES BOULARDII 250 MG
250 POWDER IN PACKET (EA) ORAL
Qty: 0 | Refills: 0 | Status: DISCONTINUED | OUTPATIENT
Start: 2019-01-19 | End: 2019-01-23

## 2019-01-19 RX ADMIN — AZITHROMYCIN 500 MILLIGRAM(S): 500 TABLET, FILM COATED ORAL at 14:37

## 2019-01-19 RX ADMIN — Medication 250 MILLIGRAM(S): at 16:30

## 2019-01-19 RX ADMIN — CEFEPIME 100 MILLIGRAM(S): 1 INJECTION, POWDER, FOR SOLUTION INTRAMUSCULAR; INTRAVENOUS at 14:15

## 2019-01-19 RX ADMIN — Medication 2 MILLIGRAM(S): at 21:40

## 2019-01-19 RX ADMIN — SODIUM CHLORIDE 3 MILLILITER(S): 9 INJECTION INTRAMUSCULAR; INTRAVENOUS; SUBCUTANEOUS at 17:53

## 2019-01-19 RX ADMIN — ENOXAPARIN SODIUM 40 MILLIGRAM(S): 100 INJECTION SUBCUTANEOUS at 11:47

## 2019-01-19 RX ADMIN — Medication 2 MILLIGRAM(S): at 15:40

## 2019-01-19 RX ADMIN — CEFEPIME 100 MILLIGRAM(S): 1 INJECTION, POWDER, FOR SOLUTION INTRAMUSCULAR; INTRAVENOUS at 05:32

## 2019-01-19 RX ADMIN — CEFEPIME 100 MILLIGRAM(S): 1 INJECTION, POWDER, FOR SOLUTION INTRAMUSCULAR; INTRAVENOUS at 21:39

## 2019-01-19 NOTE — PROGRESS NOTE ADULT - PROBLEM SELECTOR PLAN 1
fungitell, legionella, streptococcal antigen, tylenol,    infectious disease/pulm on board  azithromycin   Tablet 500 milliGRAM(s) Oral daily  cefepime   IVPB 2000 milliGRAM(s) IV Intermittent every 8 hours

## 2019-01-19 NOTE — PROGRESS NOTE ADULT - SUBJECTIVE AND OBJECTIVE BOX
Patient is a 38y old  Female who presents with a chief complaint of cold (18 Jan 2019 16:54)      INTERVAL HPI/OVERNIGHT EVENTS: coughing, still feels winded    MEDICATIONS  (STANDING):  azithromycin   Tablet 500 milliGRAM(s) Oral daily  cefepime   IVPB 2000 milliGRAM(s) IV Intermittent every 8 hours  enoxaparin Injectable 40 milliGRAM(s) SubCutaneous daily    MEDICATIONS  (PRN):  acetaminophen   Tablet .. 650 milliGRAM(s) Oral every 6 hours PRN Temp greater or equal to 38C (100.4F), Mild Pain (1 - 3)      Allergies    No Known Allergies    Intolerances             Vital Signs Last 24 Hrs  T(C): 36.8 (19 Jan 2019 06:07), Max: 37.3 (18 Jan 2019 21:30)  T(F): 98.2 (19 Jan 2019 06:07), Max: 99.2 (18 Jan 2019 21:30)  HR: 100 (19 Jan 2019 06:07) (97 - 100)  BP: 109/71 (19 Jan 2019 06:07) (102/64 - 109/71)  BP(mean): --  RR: 16 (19 Jan 2019 06:07) (16 - 17)  SpO2: 96% (19 Jan 2019 06:07) (96% - 99%)    PHYSICAL EXAM:  GENERAL: NAD, well-groomed, well-developed  HEAD:  Atraumatic, Normocephalic  EYES: EOMI, PERRLA, conjunctiva and sclera clear  ENMT: No tonsillar erythema, exudates, or enlargement; Moist mucous membranes, Good dentition, No lesions  NECK: Supple, No JVD, Normal thyroid  NERVOUS SYSTEM:  Alert & Oriented X3, Good concentration; Motor Strength 5/5 B/L upper and lower extremities; DTRs 2+ intact and symmetric  CHEST/LUNG: Clear to percussion bilaterally; No rales, rhonchi, wheezing, or rubs  HEART: Regular rate and rhythm; No murmurs, rubs, or gallops  ABDOMEN: Soft, Nontender, Nondistended; Bowel sounds present  EXTREMITIES:  2+ Peripheral Pulses, No clubbing, cyanosis, or edema  LYMPH: No lymphadenopathy noted  SKIN: No rashes or lesions    LABS:                        14.0   21.99 )-----------( 359      ( 19 Jan 2019 08:03 )             41.8     01-19    139  |  108  |  14  ----------------------------<  76  4.1   |  22  |  1.08    Ca    9.0      19 Jan 2019 08:03          CAPILLARY BLOOD GLUCOSE          RADIOLOGY & ADDITIONAL TESTS:    Imaging Personally Reviewed:  [ X] YES  [ ] NO    Consultant(s) Notes Reviewed:  [ X] YES  [ ] NO    Care Discussed with Consultants/Other Providers [X ] YES  [ ] NO

## 2019-01-19 NOTE — PROGRESS NOTE ADULT - SUBJECTIVE AND OBJECTIVE BOX
INTERVAL HPI:  38 years old female walked in c/o sob, dry cough bilateral chest pain and headache for two weeks. Cough is non productive,  associated  with some  pleuritic chest pain and short of breath. Went to urgent center and they gave her naproxen  Denied  recent long traveling or trauma, hx of taking hormone therapy, dizziness, blurred visions, light sensitivities, neck/back pain, focal/distal weakness or numbness, nausea, vomiting,, chills, abd pain, dysuria, hematuria, vaginal spotting or discharge or irregular bowel movements. Pt sts she is not at risk of being pregnant.  Denies smoking but reports 2nd hand exposure from her partner.   Admitted with multifocal pneumonia and leukocytosis.    OVERNIGHT EVENTS:  comfortable but reports sob with exertion.    Vital Signs Last 24 Hrs  T(C): 36.6 (19 Jan 2019 11:58), Max: 37.3 (18 Jan 2019 21:30)  T(F): 97.8 (19 Jan 2019 11:58), Max: 99.2 (18 Jan 2019 21:30)  HR: 113 (19 Jan 2019 11:58) (97 - 113)  BP: 108/72 (19 Jan 2019 11:58) (102/64 - 109/71)  BP(mean): --  RR: 17 (19 Jan 2019 11:58) (16 - 17)  SpO2: 98% (19 Jan 2019 11:58) (96% - 99%)    PHYSICAL EXAM:  GEN:         Awake, responsive and comfortable.  HEENT:    Normal.    RESP:        no wheezing.  CVS:          Regular rate and rhythm.   ABD:         Soft, non-tender, non-distended;     MEDICATIONS  (STANDING):  azithromycin   Tablet 500 milliGRAM(s) Oral daily  cefepime   IVPB 2000 milliGRAM(s) IV Intermittent every 8 hours  enoxaparin Injectable 40 milliGRAM(s) SubCutaneous daily  saccharomyces boulardii 250 milliGRAM(s) Oral two times a day    MEDICATIONS  (PRN):  acetaminophen   Tablet .. 650 milliGRAM(s) Oral every 6 hours PRN Temp greater or equal to 38C (100.4F), Mild Pain (1 - 3)  loperamide 2 milliGRAM(s) Oral every 6 hours PRN Diarrhea    LABS:                        14.0   21.99 )-----------( 359      ( 19 Jan 2019 08:03 )             41.8     01-19    139  |  108  |  14  ----------------------------<  76  4.1   |  22  |  1.08    Ca    9.0      19 Jan 2019 08:03    01-19 @ 09:42  pH: 7.46  pCO2: 32  pO2: 93  SaO2: 97    ASSESSMENT AND PLAN:  ·	SOB with exertion.  ·	Multifocal lung infiltrates.  ·	Leukocytosis.  ·	High WBC, LDH, sedrate and Fungitell.  ·	Negative HIV.    ABG normal on room air. Does not appear toxic to support bacterial pneumonia, procalcitonin normal.  Will try to induce sputum for culture.  May eventually need Bronchoscopy.

## 2019-01-20 DIAGNOSIS — D72.820 LYMPHOCYTOSIS (SYMPTOMATIC): ICD-10-CM

## 2019-01-20 LAB
ANION GAP SERPL CALC-SCNC: 11 MMOL/L — SIGNIFICANT CHANGE UP (ref 5–17)
BUN SERPL-MCNC: 14 MG/DL — SIGNIFICANT CHANGE UP (ref 7–23)
CALCIUM SERPL-MCNC: 9.1 MG/DL — SIGNIFICANT CHANGE UP (ref 8.5–10.1)
CHLORIDE SERPL-SCNC: 106 MMOL/L — SIGNIFICANT CHANGE UP (ref 96–108)
CO2 SERPL-SCNC: 22 MMOL/L — SIGNIFICANT CHANGE UP (ref 22–31)
CREAT SERPL-MCNC: 1.09 MG/DL — SIGNIFICANT CHANGE UP (ref 0.5–1.3)
GLUCOSE SERPL-MCNC: 71 MG/DL — SIGNIFICANT CHANGE UP (ref 70–99)
GRAM STN FLD: SIGNIFICANT CHANGE UP
HCT VFR BLD CALC: 40.3 % — SIGNIFICANT CHANGE UP (ref 34.5–45)
HGB BLD-MCNC: 13.6 G/DL — SIGNIFICANT CHANGE UP (ref 11.5–15.5)
MCHC RBC-ENTMCNC: 28.5 PG — SIGNIFICANT CHANGE UP (ref 27–34)
MCHC RBC-ENTMCNC: 33.7 GM/DL — SIGNIFICANT CHANGE UP (ref 32–36)
MCV RBC AUTO: 84.3 FL — SIGNIFICANT CHANGE UP (ref 80–100)
NRBC # BLD: 0 /100 WBCS — SIGNIFICANT CHANGE UP (ref 0–0)
PLATELET # BLD AUTO: 351 K/UL — SIGNIFICANT CHANGE UP (ref 150–400)
POTASSIUM SERPL-MCNC: 4.1 MMOL/L — SIGNIFICANT CHANGE UP (ref 3.5–5.3)
POTASSIUM SERPL-SCNC: 4.1 MMOL/L — SIGNIFICANT CHANGE UP (ref 3.5–5.3)
RBC # BLD: 4.78 M/UL — SIGNIFICANT CHANGE UP (ref 3.8–5.2)
RBC # FLD: 11.9 % — SIGNIFICANT CHANGE UP (ref 10.3–14.5)
SODIUM SERPL-SCNC: 139 MMOL/L — SIGNIFICANT CHANGE UP (ref 135–145)
SPECIMEN SOURCE: SIGNIFICANT CHANGE UP
WBC # BLD: 23.2 K/UL — HIGH (ref 3.8–10.5)
WBC # FLD AUTO: 23.2 K/UL — HIGH (ref 3.8–10.5)

## 2019-01-20 PROCEDURE — 74177 CT ABD & PELVIS W/CONTRAST: CPT | Mod: 26

## 2019-01-20 PROCEDURE — 99233 SBSQ HOSP IP/OBS HIGH 50: CPT

## 2019-01-20 RX ORDER — NYSTATIN CREAM 100000 [USP'U]/G
1 CREAM TOPICAL EVERY 12 HOURS
Qty: 0 | Refills: 0 | Status: DISCONTINUED | OUTPATIENT
Start: 2019-01-20 | End: 2019-01-23

## 2019-01-20 RX ORDER — SODIUM CHLORIDE 9 MG/ML
3 INJECTION INTRAMUSCULAR; INTRAVENOUS; SUBCUTANEOUS ONCE
Qty: 0 | Refills: 0 | Status: COMPLETED | OUTPATIENT
Start: 2019-01-21 | End: 2019-01-21

## 2019-01-20 RX ORDER — SODIUM CHLORIDE 9 MG/ML
3 INJECTION INTRAMUSCULAR; INTRAVENOUS; SUBCUTANEOUS ONCE
Qty: 0 | Refills: 0 | Status: COMPLETED | OUTPATIENT
Start: 2019-01-20 | End: 2019-01-20

## 2019-01-20 RX ORDER — IOHEXOL 300 MG/ML
30 INJECTION, SOLUTION INTRAVENOUS ONCE
Qty: 0 | Refills: 0 | Status: COMPLETED | OUTPATIENT
Start: 2019-01-20 | End: 2019-01-20

## 2019-01-20 RX ADMIN — Medication 650 MILLIGRAM(S): at 21:39

## 2019-01-20 RX ADMIN — SODIUM CHLORIDE 3 MILLILITER(S): 9 INJECTION INTRAMUSCULAR; INTRAVENOUS; SUBCUTANEOUS at 05:28

## 2019-01-20 RX ADMIN — CEFEPIME 100 MILLIGRAM(S): 1 INJECTION, POWDER, FOR SOLUTION INTRAMUSCULAR; INTRAVENOUS at 21:41

## 2019-01-20 RX ADMIN — Medication 650 MILLIGRAM(S): at 22:30

## 2019-01-20 RX ADMIN — AZITHROMYCIN 500 MILLIGRAM(S): 500 TABLET, FILM COATED ORAL at 11:49

## 2019-01-20 RX ADMIN — Medication 2 MILLIGRAM(S): at 21:41

## 2019-01-20 RX ADMIN — IOHEXOL 30 MILLILITER(S): 300 INJECTION, SOLUTION INTRAVENOUS at 11:49

## 2019-01-20 RX ADMIN — CEFEPIME 100 MILLIGRAM(S): 1 INJECTION, POWDER, FOR SOLUTION INTRAMUSCULAR; INTRAVENOUS at 11:54

## 2019-01-20 RX ADMIN — Medication 250 MILLIGRAM(S): at 17:00

## 2019-01-20 RX ADMIN — CEFEPIME 100 MILLIGRAM(S): 1 INJECTION, POWDER, FOR SOLUTION INTRAMUSCULAR; INTRAVENOUS at 05:41

## 2019-01-20 RX ADMIN — Medication 2 MILLIGRAM(S): at 11:52

## 2019-01-20 RX ADMIN — SODIUM CHLORIDE 3 MILLILITER(S): 9 INJECTION INTRAMUSCULAR; INTRAVENOUS; SUBCUTANEOUS at 18:16

## 2019-01-20 RX ADMIN — Medication 250 MILLIGRAM(S): at 05:41

## 2019-01-20 NOTE — CONSULT NOTE ADULT - SUBJECTIVE AND OBJECTIVE BOX
Patient is a 38y old  Female admitted in the Emergency Department        With  sob, dry cough bilateral chest pain and headache for two weeks. Cough is non productive,  associated  with some  pleuritic chest pain and short of breath.        Denied  recent long traveling or trauma, hx of taking hormone therapy, dizziness, blurred visions, light sensitivities, neck/back pain, focal/distal weakness or numbness, nausea, vomiting,, chills, abd pain, dysuria, hematuria, vaginal spotting or discharge or irregular bowel movements. Pt sts she is not at risk of being pregnant.    Denies smoking but reports 2nd hand exposure from her partner.     Admitted with multifocal pneumonia and leukocytosis.    PAST MEDICAL & SURGICAL HISTORY:  No pertinent past medical history  No significant past surgical history    SOCIAL HISTORY: denies.    Allergies  No Known Allergies    MEDICATIONS  (STANDING):  azithromycin   Tablet 500 milliGRAM(s) Oral daily  cefepime   IVPB 2000 milliGRAM(s) IV Intermittent every 8 hours  enoxaparin Injectable 40 milliGRAM(s) SubCutaneous daily    MEDICATIONS  (PRN):  acetaminophen   Tablet .. 650 milliGRAM(s) Oral every 6 hours PRN Temp greater or equal to 38C (100.4F), Mild Pain (1 - 3)    Vital Signs Last 24 Hrs  T(C): 37.1   T(F): 98.7   HR: 97   BP: 103/67  BP(mean): --  RR: 17   SpO2: 98%    PHYSICAL EXAM:  GEN:         Awake, responsive and comfortable.  HEENT:    Normal.    RESP:        no wheezing.  CVS:          Regular rate and rhythm.   ABD:         Soft, non-tender, non-distended;   :             No costovertebral angle tenderness  SKIN:           Warm and dry.  EXTR:            No clubbing, cyanosis or edema  CNS:              Intact sensory and motor function.  PSYCH:        cooperative, no anxiety or depression    LABS:                        13.4   24.11 )-----------( 361                 41.2         141  |  110<H>  |  11  ----------------------------<  81  3.7   |  22  |  1.08    Ca    8.7      18 Jan 2019 07:53    Culture - Blood (collected 01-17-19 @ 00:25)  Source: .Blood Blood-Peripheral  Preliminary Report (01-18-19 @ 01:04):    No growth to date.    Culture - Blood (collected 01-17-19 @ 00:25)  Source: .Blood Blood-Peripheral  Preliminary Report (01-18-19 @ 01:04):    No growth to date.    RADIOLOGY & ADDITIONAL STUDIES:  < from: CT Angio Chest w/ IV Cont (01.16.19 @ 14:10) >    EXAM:  CT ANGIO CHEST (W)AW IC                          PROCEDURE DATE:  01/16/2019      INTERPRETATION:  Pulmonary CTA    Indication: Dry cough, chest pain and shortness of breath.    Technique: Axial multidetector CT images of the chest areacquired from   the thoracic inlet to the upper abdomen following the administration of   IV contrast (75 cc Omnipaque-350, 25 cc discarded) according to our   pulmonary embolism protocol. Subsequent MIP is also reconstructed for   evaluation.    Comparison: None.    Findings: No evidence for aortic dissection, or aneurysm. No suspicious   filling defect is identified in the pulmonary arteries to suggest   pulmonary embolism.    No pleural or pericardial effusion. The heart is not enlarged. No   enlarged mediastinal, or axillary lymph node. Nonspecific mildly enlarged   1.4 cm right hilar lymph node.    The trachea and central bronchi appear grossly unremarkable.    No evidence for pneumothorax. Patchy airspace opacifications in both lung   for which clinical correlation with pneumonia is recommended.    Limited sections through the upper abdomen demonstrate nonspecific mild   left adrenal thickening.    Impression: No evidence for pulmonary embolism.     Patchy airspace opacifications in both lungs may represent pneumonia.   Clinical correction is recommended.    Nonspecific mildly enlarged right hilar lymph node.    SLIME GARCÍA M.D., ATTENDING RADIOLOGIST  This document has been electronically signed. Jan 16 2019  2:31PM

## 2019-01-20 NOTE — PROGRESS NOTE ADULT - SUBJECTIVE AND OBJECTIVE BOX
Patient is a 38y old  Female who presents with a chief complaint of cold (19 Jan 2019 17:17)      INTERVAL HPI/OVERNIGHT EVENTS: no events     MEDICATIONS  (STANDING):  azithromycin   Tablet 500 milliGRAM(s) Oral daily  cefepime   IVPB 2000 milliGRAM(s) IV Intermittent every 8 hours  enoxaparin Injectable 40 milliGRAM(s) SubCutaneous daily  saccharomyces boulardii 250 milliGRAM(s) Oral two times a day    MEDICATIONS  (PRN):  acetaminophen   Tablet .. 650 milliGRAM(s) Oral every 6 hours PRN Temp greater or equal to 38C (100.4F), Mild Pain (1 - 3)  loperamide 2 milliGRAM(s) Oral every 6 hours PRN Diarrhea      Allergies    No Known Allergies    Intolerances           Vital Signs Last 24 Hrs  T(C): 37.1 (20 Jan 2019 05:13), Max: 37.2 (19 Jan 2019 16:46)  T(F): 98.8 (20 Jan 2019 05:13), Max: 99 (19 Jan 2019 16:46)  HR: 99 (20 Jan 2019 05:13) (98 - 117)  BP: 103/60 (20 Jan 2019 05:13) (102/65 - 108/72)  BP(mean): --  RR: 16 (20 Jan 2019 05:13) (16 - 17)  SpO2: 96% (20 Jan 2019 05:13) (96% - 98%)    PHYSICAL EXAM:  GENERAL: NAD, well-groomed, well-developed  HEAD:  Atraumatic, Normocephalic  EYES: EOMI, PERRLA, conjunctiva and sclera clear  ENMT: No tonsillar erythema, exudates, or enlargement; Moist mucous membranes, Good dentition, No lesions  NECK: Supple, No JVD, Normal thyroid  NERVOUS SYSTEM:  Alert & Oriented X3, Good concentration; Motor Strength 5/5 B/L upper and lower extremities; DTRs 2+ intact and symmetric  CHEST/LUNG: Clear to percussion bilaterally; No rales, rhonchi, wheezing, or rubs  HEART: Regular rate and rhythm; No murmurs, rubs, or gallops  ABDOMEN: Soft, Nontender, Nondistended; Bowel sounds present  EXTREMITIES:  2+ Peripheral Pulses, No clubbing, cyanosis, or edema  LYMPH: No lymphadenopathy noted  SKIN: No rashes or lesions    LABS:                        13.6   23.20 )-----------( 351      ( 20 Jan 2019 07:09 )             40.3     01-20    139  |  106  |  14  ----------------------------<  71  4.1   |  22  |  1.09    Ca    9.1      20 Jan 2019 07:09          CAPILLARY BLOOD GLUCOSE          RADIOLOGY & ADDITIONAL TESTS:    Imaging Personally Reviewed:  [ X] YES  [ ] NO    Consultant(s) Notes Reviewed:  [ X] YES  [ ] NO    Care Discussed with Consultants/Other Providers [X ] YES  [ ] NO

## 2019-01-20 NOTE — CONSULT NOTE ADULT - ASSESSMENT
38 yr old  female with no pre existing illness came in with two weeks of
ASSESSMENT AND PLAN:  Bilateral GGO  SOB.  Multifocal Pneumonia.  Leukocytosis.        Plan  Will arrange for FOB and Transbronchial Bx
Leukocytosis

## 2019-01-20 NOTE — PROGRESS NOTE ADULT - SUBJECTIVE AND OBJECTIVE BOX
INTERVAL HPI:   38 years old female walked in c/o sob, dry cough bilateral chest pain and headache for two weeks. Cough is non productive,  associated  with some  pleuritic chest pain and short of breath. Went to urgent center and they gave her naproxen  Denied  recent long traveling or trauma, hx of taking hormone therapy, dizziness, blurred visions, light sensitivities, neck/back pain, focal/distal weakness or numbness, nausea, vomiting,, chills, abd pain, dysuria, hematuria, vaginal spotting or discharge or irregular bowel movements. Pt sts she is not at risk of being pregnant.  Denies smoking but reports 2nd hand exposure from her partner.   Admitted with multifocal pneumonia and leukocytosis.    OVERNIGHT EVENTS:  Awake, comfortable.    Vital Signs Last 24 Hrs  T(C): 37.2 (20 Jan 2019 17:28), Max: 37.2 (20 Jan 2019 17:28)  T(F): 98.9 (20 Jan 2019 17:28), Max: 98.9 (20 Jan 2019 17:28)  HR: 105 (20 Jan 2019 17:28) (98 - 117)  BP: 105/64 (20 Jan 2019 17:28) (103/53 - 112/70)  BP(mean): --  RR: 17 (20 Jan 2019 17:28) (16 - 17)  SpO2: 96% (20 Jan 2019 17:28) (96% - 100%)    PHYSICAL EXAM:  GEN:         Awake, responsive and comfortable.  HEENT:    Normal.    RESP:        no wheezing.  CVS:           Regular rate and rhythm.   ABD:         Soft, non-tender, non-distended;     MEDICATIONS  (STANDING):  azithromycin   Tablet 500 milliGRAM(s) Oral daily  cefepime   IVPB 2000 milliGRAM(s) IV Intermittent every 8 hours  enoxaparin Injectable 40 milliGRAM(s) SubCutaneous daily  saccharomyces boulardii 250 milliGRAM(s) Oral two times a day    MEDICATIONS  (PRN):  acetaminophen   Tablet .. 650 milliGRAM(s) Oral every 6 hours PRN Temp greater or equal to 38C (100.4F), Mild Pain (1 - 3)  loperamide 2 milliGRAM(s) Oral every 6 hours PRN Diarrhea    LABS:                        13.6   23.20 )-----------( 351      ( 20 Jan 2019 07:09 )             40.3     01-20    139  |  106  |  14  ----------------------------<  71  4.1   |  22  |  1.09    Ca    9.1      20 Jan 2019 07:09    01-19 @ 09:42  pH: 7.46  pCO2: 32  pO2: 93  SaO2: 97    ASSESSMENT AND PLAN:  ·	SOB with exertion.  ·	Multifocal lung infiltrates.  ·	Leukocytosis.  ·	High WBC, LDH, sedrate and Fungitell.  ·	Negative HIV.    Sputum  C/S was sent just after hypertonic saline nebulization. Pt reports expectoration 30 to 45 minutes after treatment.  Will repeat sputum collection. Pt and RN explained when to collection sputum sample.  Seen by Thoracic surgery and is being planned for Bronchoscopy.  Please   send bronchial washing for cell count with diff,   gram stain & culture, fungus culture, AFB smear and culture and Cytology. INTERVAL HPI:   38 years old female walked in c/o sob, dry cough bilateral chest pain and headache for two weeks. Cough is non productive,  associated  with some  pleuritic chest pain and short of breath. Went to urgent center and they gave her naproxen  Denied  recent long traveling or trauma, hx of taking hormone therapy, dizziness, blurred visions, light sensitivities, neck/back pain, focal/distal weakness or numbness, nausea, vomiting,, chills, abd pain, dysuria, hematuria, vaginal spotting or discharge or irregular bowel movements. Pt sts she is not at risk of being pregnant.  Denies smoking but reports 2nd hand exposure from her partner.   Admitted with multifocal pneumonia and leukocytosis.    OVERNIGHT EVENTS:  Awake, comfortable.    Vital Signs Last 24 Hrs  T(C): 37.2 (20 Jan 2019 17:28), Max: 37.2 (20 Jan 2019 17:28)  T(F): 98.9 (20 Jan 2019 17:28), Max: 98.9 (20 Jan 2019 17:28)  HR: 105 (20 Jan 2019 17:28) (98 - 117)  BP: 105/64 (20 Jan 2019 17:28) (103/53 - 112/70)  BP(mean): --  RR: 17 (20 Jan 2019 17:28) (16 - 17)  SpO2: 96% (20 Jan 2019 17:28) (96% - 100%)    PHYSICAL EXAM:  GEN:         Awake, responsive and comfortable.  HEENT:    Normal.    RESP:        no wheezing.  CVS:           Regular rate and rhythm.   ABD:         Soft, non-tender, non-distended;     MEDICATIONS  (STANDING):  azithromycin   Tablet 500 milliGRAM(s) Oral daily  cefepime   IVPB 2000 milliGRAM(s) IV Intermittent every 8 hours  enoxaparin Injectable 40 milliGRAM(s) SubCutaneous daily  saccharomyces boulardii 250 milliGRAM(s) Oral two times a day    MEDICATIONS  (PRN):  acetaminophen   Tablet .. 650 milliGRAM(s) Oral every 6 hours PRN Temp greater or equal to 38C (100.4F), Mild Pain (1 - 3)  loperamide 2 milliGRAM(s) Oral every 6 hours PRN Diarrhea    LABS:                        13.6   23.20 )-----------( 351      ( 20 Jan 2019 07:09 )             40.3     01-20    139  |  106  |  14  ----------------------------<  71  4.1   |  22  |  1.09    Ca    9.1      20 Jan 2019 07:09    01-19 @ 09:42  pH: 7.46  pCO2: 32  pO2: 93  SaO2: 97    ASSESSMENT AND PLAN:  ·	SOB with exertion.  ·	Multifocal lung infiltrates.  ·	Leukocytosis.  ·	High WBC, LDH, sedrate and Fungitell.  ·	Negative HIV.    Sputum  C/S was sent just after hypertonic saline nebulization. Pt reports expectoration 30 to 45 minutes after treatment.  Will repeat sputum collection. Pt and RN explained when to collection sputum sample.  Seen by Thoracic surgery and is being planned for Bronchoscopy.  Please   send bronchial washing for cell count with diff,   gram stain & culture, fungus culture, AFB smear and culture, PCP,  and Cytology.

## 2019-01-20 NOTE — CONSULT NOTE ADULT - SUBJECTIVE AND OBJECTIVE BOX
Reason for Consultation: leukocytosis    HPI: Patient is a 38y Female seen on consultatioin for the evaluation and management of leukocytosis    38 years old female walked in c/o sob, dry cough bilateral chest pain and headache for two weeks. Cough is non productive,  associated  with some  pleuritic chest pain and short of breath. Went to urgent center and they gave her naproxen  Denied  recent long traveling or trauma, hx of taking hormone therapy, dizziness, blurred visions, light sensitivities, neck/back pain, focal/distal weakness or numbness, nausea, vomiting,, chills, abd pain, dysuria, hematuria, vaginal spotting or discharge or irregular bowel movements. Pt sts she is not at risk of being pregnant.  Denies smoking but reports 2nd hand exposure from her partner.   Admitted with multifocal pneumonia and leukocytosis.    denies fevers, night sweats, weigh loss    PAST MEDICAL & SURGICAL HISTORY:  No pertinent past medical history  No significant past surgical history        MEDICATIONS  (STANDING):  azithromycin   Tablet 500 milliGRAM(s) Oral daily  cefepime   IVPB 2000 milliGRAM(s) IV Intermittent every 8 hours  enoxaparin Injectable 40 milliGRAM(s) SubCutaneous daily  iohexol 300 mG (iodine)/mL Oral Solution 30 milliLiter(s) Oral once  saccharomyces boulardii 250 milliGRAM(s) Oral two times a day    MEDICATIONS  (PRN):  acetaminophen   Tablet .. 650 milliGRAM(s) Oral every 6 hours PRN Temp greater or equal to 38C (100.4F), Mild Pain (1 - 3)  loperamide 2 milliGRAM(s) Oral every 6 hours PRN Diarrhea      Allergies    No Known Allergies    Intolerances        SOCIAL HISTORY:    Smoking Status: denies  Alcohol: denies  Marital Status: yes  Occupation: yes    FAMILY HISTORY:        Vital Signs Last 24 Hrs  T(C): 37.1 (20 Jan 2019 05:13), Max: 37.2 (19 Jan 2019 16:46)  T(F): 98.8 (20 Jan 2019 05:13), Max: 99 (19 Jan 2019 16:46)  HR: 99 (20 Jan 2019 05:13) (98 - 117)  BP: 103/60 (20 Jan 2019 05:13) (102/65 - 108/72)  BP(mean): --  RR: 16 (20 Jan 2019 05:13) (16 - 17)  SpO2: 96% (20 Jan 2019 05:13) (96% - 98%)    PHYSICAL EXAM:    general - AAO x 3  HEENT - No Icterus  CVS - RRR  RS - AE B/L  Abd - soft, NT  Ext - Pulses +        LABS:                        13.6   23.20 )-----------( 351      ( 20 Jan 2019 07:09 )             40.3     01-20    139  |  106  |  14  ----------------------------<  71  4.1   |  22  |  1.09    Ca    9.1      20 Jan 2019 07:09            Culture - Sputum (collected 20 Jan 2019 02:06)  Source: .Sputum Sputum  Gram Stain (20 Jan 2019 05:53):    Rare polymorphonuclear leukocytes per low power field    Numerous Squamous epithelial cells per low power field    Moderate Gram Positive Cocci in Pairs and Chains seen per oil power field    Moderate Gram Positive Cocci in Clusters seen per oil power field    Moderate Gram Negative Rods seen per oil power field    Culture - Blood (collected 17 Jan 2019 00:25)  Source: .Blood Blood-Peripheral  Preliminary Report (18 Jan 2019 01:04):    No growth to date.    Culture - Blood (collected 17 Jan 2019 00:25)  Source: .Blood Blood-Peripheral  Preliminary Report (18 Jan 2019 01:04):    No growth to date.        RADIOLOGY & ADDITIONAL STUDIES:

## 2019-01-20 NOTE — PROGRESS NOTE ADULT - PROBLEM SELECTOR PLAN 1
fungitell elevated, legionella negative, streptococcal antigen, tylenol,    infectious disease/pulm on board  azithromycin   Tablet 500 milliGRAM(s) Oral daily  cefepime   IVPB 2000 milliGRAM(s) IV Intermittent every 8 hours  May need bronch

## 2019-01-21 LAB
ANION GAP SERPL CALC-SCNC: 7 MMOL/L — SIGNIFICANT CHANGE UP (ref 5–17)
BASOPHILS # BLD AUTO: 0 K/UL — SIGNIFICANT CHANGE UP (ref 0–0.2)
BASOPHILS NFR BLD AUTO: 0 % — SIGNIFICANT CHANGE UP (ref 0–2)
BUN SERPL-MCNC: 19 MG/DL — SIGNIFICANT CHANGE UP (ref 7–23)
CALCIUM SERPL-MCNC: 8.7 MG/DL — SIGNIFICANT CHANGE UP (ref 8.5–10.1)
CHLORIDE SERPL-SCNC: 107 MMOL/L — SIGNIFICANT CHANGE UP (ref 96–108)
CO2 SERPL-SCNC: 25 MMOL/L — SIGNIFICANT CHANGE UP (ref 22–31)
CREAT SERPL-MCNC: 1.01 MG/DL — SIGNIFICANT CHANGE UP (ref 0.5–1.3)
CULTURE RESULTS: SIGNIFICANT CHANGE UP
EOSINOPHIL # BLD AUTO: 0.59 K/UL — HIGH (ref 0–0.5)
EOSINOPHIL NFR BLD AUTO: 3 % — SIGNIFICANT CHANGE UP (ref 0–6)
GLUCOSE SERPL-MCNC: 87 MG/DL — SIGNIFICANT CHANGE UP (ref 70–99)
GRAM STN FLD: SIGNIFICANT CHANGE UP
GRAM STN FLD: SIGNIFICANT CHANGE UP
HCT VFR BLD CALC: 38.6 % — SIGNIFICANT CHANGE UP (ref 34.5–45)
HGB BLD-MCNC: 13 G/DL — SIGNIFICANT CHANGE UP (ref 11.5–15.5)
LDH SERPL L TO P-CCNC: 473 U/L — HIGH (ref 50–242)
LYMPHOCYTES # BLD AUTO: 12.82 K/UL — HIGH (ref 1–3.3)
LYMPHOCYTES # BLD AUTO: 65 % — HIGH (ref 13–44)
MCHC RBC-ENTMCNC: 28.4 PG — SIGNIFICANT CHANGE UP (ref 27–34)
MCHC RBC-ENTMCNC: 33.7 GM/DL — SIGNIFICANT CHANGE UP (ref 32–36)
MCV RBC AUTO: 84.5 FL — SIGNIFICANT CHANGE UP (ref 80–100)
MONOCYTES # BLD AUTO: 1.97 K/UL — HIGH (ref 0–0.9)
MONOCYTES NFR BLD AUTO: 10 % — SIGNIFICANT CHANGE UP (ref 2–14)
NEUTROPHILS # BLD AUTO: 4.34 K/UL — SIGNIFICANT CHANGE UP (ref 1.8–7.4)
NEUTROPHILS NFR BLD AUTO: 22 % — LOW (ref 43–77)
PLATELET # BLD AUTO: 341 K/UL — SIGNIFICANT CHANGE UP (ref 150–400)
POTASSIUM SERPL-MCNC: 4 MMOL/L — SIGNIFICANT CHANGE UP (ref 3.5–5.3)
POTASSIUM SERPL-SCNC: 4 MMOL/L — SIGNIFICANT CHANGE UP (ref 3.5–5.3)
RBC # BLD: 4.57 M/UL — SIGNIFICANT CHANGE UP (ref 3.8–5.2)
RBC # BLD: 4.57 M/UL — SIGNIFICANT CHANGE UP (ref 3.8–5.2)
RBC # FLD: 11.9 % — SIGNIFICANT CHANGE UP (ref 10.3–14.5)
RETICS #: 50.7 K/UL — SIGNIFICANT CHANGE UP (ref 25–125)
RETICS/RBC NFR: 1.1 % — SIGNIFICANT CHANGE UP (ref 0.5–2.5)
SODIUM SERPL-SCNC: 139 MMOL/L — SIGNIFICANT CHANGE UP (ref 135–145)
SPECIMEN SOURCE: SIGNIFICANT CHANGE UP
SPECIMEN SOURCE: SIGNIFICANT CHANGE UP
WBC # BLD: 19.73 K/UL — HIGH (ref 3.8–10.5)
WBC # FLD AUTO: 19.73 K/UL — HIGH (ref 3.8–10.5)

## 2019-01-21 PROCEDURE — 99233 SBSQ HOSP IP/OBS HIGH 50: CPT

## 2019-01-21 RX ADMIN — Medication 250 MILLIGRAM(S): at 17:13

## 2019-01-21 RX ADMIN — Medication 650 MILLIGRAM(S): at 17:13

## 2019-01-21 RX ADMIN — AZITHROMYCIN 500 MILLIGRAM(S): 500 TABLET, FILM COATED ORAL at 11:22

## 2019-01-21 RX ADMIN — NYSTATIN CREAM 1 APPLICATION(S): 100000 CREAM TOPICAL at 05:22

## 2019-01-21 RX ADMIN — CEFEPIME 100 MILLIGRAM(S): 1 INJECTION, POWDER, FOR SOLUTION INTRAMUSCULAR; INTRAVENOUS at 22:18

## 2019-01-21 RX ADMIN — CEFEPIME 100 MILLIGRAM(S): 1 INJECTION, POWDER, FOR SOLUTION INTRAMUSCULAR; INTRAVENOUS at 13:06

## 2019-01-21 RX ADMIN — NYSTATIN CREAM 1 APPLICATION(S): 100000 CREAM TOPICAL at 17:14

## 2019-01-21 RX ADMIN — CEFEPIME 100 MILLIGRAM(S): 1 INJECTION, POWDER, FOR SOLUTION INTRAMUSCULAR; INTRAVENOUS at 05:22

## 2019-01-21 RX ADMIN — Medication 250 MILLIGRAM(S): at 05:21

## 2019-01-21 RX ADMIN — Medication 650 MILLIGRAM(S): at 17:49

## 2019-01-21 RX ADMIN — SODIUM CHLORIDE 3 MILLILITER(S): 9 INJECTION INTRAMUSCULAR; INTRAVENOUS; SUBCUTANEOUS at 05:44

## 2019-01-21 RX ADMIN — ENOXAPARIN SODIUM 40 MILLIGRAM(S): 100 INJECTION SUBCUTANEOUS at 11:22

## 2019-01-21 RX ADMIN — Medication 2 MILLIGRAM(S): at 22:54

## 2019-01-21 NOTE — PROGRESS NOTE ADULT - SUBJECTIVE AND OBJECTIVE BOX
Patient is a 38y old  Female who presents with a chief complaint of cold (21 Jan 2019 10:09)      INTERVAL HPI/OVERNIGHT EVENTS: no events     MEDICATIONS  (STANDING):  azithromycin   Tablet 500 milliGRAM(s) Oral daily  cefepime   IVPB 2000 milliGRAM(s) IV Intermittent every 8 hours  enoxaparin Injectable 40 milliGRAM(s) SubCutaneous daily  nystatin Powder 1 Application(s) Topical every 12 hours  saccharomyces boulardii 250 milliGRAM(s) Oral two times a day    MEDICATIONS  (PRN):  acetaminophen   Tablet .. 650 milliGRAM(s) Oral every 6 hours PRN Temp greater or equal to 38C (100.4F), Mild Pain (1 - 3)  loperamide 2 milliGRAM(s) Oral every 6 hours PRN Diarrhea      Allergies    No Known Allergies    Intolerances        REVIEW OF SYSTEMS:  CONSTITUTIONAL: No fever, weight loss, or fatigue  EYES: No eye pain, visual disturbances, or discharge  ENMT:  No difficulty hearing, tinnitus, vertigo; No sinus or throat pain  NECK: No pain or stiffness  BREASTS: No pain, masses, or nipple discharge  RESPIRATORY: No cough, wheezing, chills or hemoptysis; No shortness of breath  CARDIOVASCULAR: No chest pain, palpitations, dizziness, or leg swelling  GASTROINTESTINAL: No abdominal or epigastric pain. No nausea, vomiting, or hematemesis; No diarrhea or constipation. No melena or hematochezia.  GENITOURINARY: No dysuria, frequency, hematuria, or incontinence  NEUROLOGICAL: No headaches, memory loss, loss of strength, numbness, or tremors  SKIN: No itching, burning, rashes, or lesions   LYMPH NODES: No enlarged glands  ENDOCRINE: No heat or cold intolerance; No hair loss  MUSCULOSKELETAL: No joint pain or swelling; No muscle, back, or extremity pain  PSYCHIATRIC: No depression, anxiety, mood swings, or difficulty sleeping  HEME/LYMPH: No easy bruising, or bleeding gums  ALLERGY AND IMMUNOLOGIC: No hives or eczema    Vital Signs Last 24 Hrs  T(C): 36 (21 Jan 2019 05:17), Max: 37.2 (20 Jan 2019 17:28)  T(F): 96.8 (21 Jan 2019 05:17), Max: 98.9 (20 Jan 2019 17:28)  HR: 95 (21 Jan 2019 05:17) (85 - 117)  BP: 107/57 (21 Jan 2019 05:17) (100/59 - 112/70)  BP(mean): --  RR: 17 (21 Jan 2019 05:17) (17 - 17)  SpO2: 96% (21 Jan 2019 05:17) (96% - 100%)    PHYSICAL EXAM:  GENERAL: NAD, well-groomed, well-developed  HEAD:  Atraumatic, Normocephalic  EYES: EOMI, PERRLA, conjunctiva and sclera clear  ENMT: No tonsillar erythema, exudates, or enlargement; Moist mucous membranes, Good dentition, No lesions  NECK: Supple, No JVD, Normal thyroid  NERVOUS SYSTEM:  Alert & Oriented X3, Good concentration; Motor Strength 5/5 B/L upper and lower extremities; DTRs 2+ intact and symmetric  CHEST/LUNG: Clear to percussion bilaterally; No rales, rhonchi, wheezing, or rubs  HEART: Regular rate and rhythm; No murmurs, rubs, or gallops  ABDOMEN: Soft, Nontender, Nondistended; Bowel sounds present  EXTREMITIES:  2+ Peripheral Pulses, No clubbing, cyanosis, or edema  LYMPH: No lymphadenopathy noted  SKIN: No rashes or lesions    LABS:                        13.0   19.73 )-----------( 341      ( 21 Jan 2019 06:23 )             38.6     01-21    139  |  107  |  19  ----------------------------<  87  4.0   |  25  |  1.01    Ca    8.7      21 Jan 2019 06:23          CAPILLARY BLOOD GLUCOSE          RADIOLOGY & ADDITIONAL TESTS:    Imaging Personally Reviewed:  [ X] YES  [ ] NO    Consultant(s) Notes Reviewed:  [ X] YES  [ ] NO    Care Discussed with Consultants/Other Providers [X ] YES  [ ] NO

## 2019-01-21 NOTE — PROGRESS NOTE ADULT - PROBLEM SELECTOR PLAN 1
- will send flow, lab closed so cannot send today  - ID f/u, Abx per ID  - Thoracic planning bronschospy  - DVT Prophylaxsis

## 2019-01-21 NOTE — PROGRESS NOTE ADULT - SUBJECTIVE AND OBJECTIVE BOX
lying in bed, for bronschospy    Vital Signs Last 24 Hrs  T(C): 36 (21 Jan 2019 05:17), Max: 37.2 (20 Jan 2019 17:28)  T(F): 96.8 (21 Jan 2019 05:17), Max: 98.9 (20 Jan 2019 17:28)  HR: 95 (21 Jan 2019 05:17) (85 - 117)  BP: 107/57 (21 Jan 2019 05:17) (100/59 - 112/70)  BP(mean): --  RR: 17 (21 Jan 2019 05:17) (17 - 17)  SpO2: 96% (21 Jan 2019 05:17) (96% - 100%)    PHYSICAL EXAM:    general - AAO x 3  HEENT - No Icterus  CVS - RRR  RS - AE B/L  Abd - soft, NT  Ext - Pulses +        LABS:                        13.0   19.73 )-----------( 341      ( 21 Jan 2019 06:23 )             38.6     01-21    139  |  107  |  19  ----------------------------<  87  4.0   |  25  |  1.01    Ca    8.7      21 Jan 2019 06:23            Culture - Sputum (collected 20 Jan 2019 02:06)  Source: .Sputum Sputum  Gram Stain (prelim) (20 Jan 2019 19:32):    Rare polymorphonuclear leukocytes per low power field    Numerous Squamous epithelial cells per low power field    Moderate Gram Positive Cocci in Pairs and Chains seen per oil power field    Moderate Gram Positive Cocci in Clusters seen per oil power field    Moderate Gram Negative Rods seen per oil power field  Preliminary Report (20 Jan 2019 19:32):    Normal Respiratory Cece present    Culture - Blood (collected 17 Jan 2019 00:25)  Source: .Blood Blood-Peripheral  Preliminary Report (18 Jan 2019 01:04):    No growth to date.    Culture - Blood (collected 17 Jan 2019 00:25)  Source: .Blood Blood-Peripheral  Preliminary Report (18 Jan 2019 01:04):    No growth to date.        RADIOLOGY & ADDITIONAL STUDIES:

## 2019-01-21 NOTE — PROGRESS NOTE ADULT - PROBLEM SELECTOR PLAN 1
fungitell elevated, legionella negative, streptococcal antigen, tylenol,    infectious disease/pulm on board  azithromycin   Tablet 500 milliGRAM(s) Oral daily  cefepime   IVPB 2000 milliGRAM(s) IV Intermittent every 8 hours    Plan for Bronch

## 2019-01-21 NOTE — PROGRESS NOTE ADULT - SUBJECTIVE AND OBJECTIVE BOX
INTERVAL HPI:  38 years old female walked in c/o sob, dry cough bilateral chest pain and headache for two weeks. Cough is non productive,  associated  with some  pleuritic chest pain and short of breath. Went to urgent center and they gave her naproxen  Denied  recent long traveling or trauma, hx of taking hormone therapy, dizziness, blurred visions, light sensitivities, neck/back pain, focal/distal weakness or numbness, nausea, vomiting,, chills, abd pain, dysuria, hematuria, vaginal spotting or discharge or irregular bowel movements. Pt sts she is not at risk of being pregnant.  Denies smoking but reports 2nd hand exposure from her partner.   Admitted with multifocal pneumonia and leukocytosis.    OVERNIGHT EVENTS:  Exertional sob persists.    Vital Signs Last 24 Hrs  T(C): 36 (21 Jan 2019 05:17), Max: 37.2 (20 Jan 2019 17:28)  T(F): 96.8 (21 Jan 2019 05:17), Max: 98.9 (20 Jan 2019 17:28)  HR: 95 (21 Jan 2019 05:17) (85 - 117)  BP: 107/57 (21 Jan 2019 05:17) (100/59 - 112/70)  BP(mean): --  RR: 17 (21 Jan 2019 05:17) (17 - 17)  SpO2: 96% (21 Jan 2019 05:17) (96% - 100%)    PHYSICAL EXAM:  GEN:         Awake, responsive and comfortable.  HEENT:    Normal.    RESP:        no wheezing.  CVS:          Regular rate and rhythm.   ABD:         Soft, non-tender, non-distended;     MEDICATIONS  (STANDING):  azithromycin   Tablet 500 milliGRAM(s) Oral daily  cefepime   IVPB 2000 milliGRAM(s) IV Intermittent every 8 hours  enoxaparin Injectable 40 milliGRAM(s) SubCutaneous daily  nystatin Powder 1 Application(s) Topical every 12 hours  saccharomyces boulardii 250 milliGRAM(s) Oral two times a day    MEDICATIONS  (PRN):  acetaminophen   Tablet .. 650 milliGRAM(s) Oral every 6 hours PRN Temp greater or equal to 38C (100.4F), Mild Pain (1 - 3)  loperamide 2 milliGRAM(s) Oral every 6 hours PRN Diarrhea    LABS:                        13.0   19.73 )-----------( 341      ( 21 Jan 2019 06:23 )             38.6     01-21    139  |  107  |  19  ----------------------------<  87  4.0   |  25  |  1.01    Ca    8.7      21 Jan 2019 06:23    01-19 @ 09:42  pH: 7.46  pCO2: 32  pO2: 93  SaO2: 97    ASSESSMENT AND PLAN:  ·	SOB with exertion.  ·	Multifocal lung infiltrates.  ·	Leukocytosis.  ·	High WBC, LDH, sedrate and Fungitell.  ·	Negative HIV.    Seen by Thoracic surgery and is being planned for Bronchoscopy.  Please   send bronchial washing for cell count with diff,   gram stain & culture, fungus culture, AFB smear and culture, PCP,  and Cytology.

## 2019-01-22 ENCOUNTER — TRANSCRIPTION ENCOUNTER (OUTPATIENT)
Age: 39
End: 2019-01-22

## 2019-01-22 LAB
ANION GAP SERPL CALC-SCNC: 6 MMOL/L — SIGNIFICANT CHANGE UP (ref 5–17)
BUN SERPL-MCNC: 16 MG/DL — SIGNIFICANT CHANGE UP (ref 7–23)
CALCIUM SERPL-MCNC: 8.5 MG/DL — SIGNIFICANT CHANGE UP (ref 8.5–10.1)
CHLORIDE SERPL-SCNC: 107 MMOL/L — SIGNIFICANT CHANGE UP (ref 96–108)
CO2 SERPL-SCNC: 26 MMOL/L — SIGNIFICANT CHANGE UP (ref 22–31)
CREAT SERPL-MCNC: 0.99 MG/DL — SIGNIFICANT CHANGE UP (ref 0.5–1.3)
CULTURE RESULTS: SIGNIFICANT CHANGE UP
CULTURE RESULTS: SIGNIFICANT CHANGE UP
GLUCOSE SERPL-MCNC: 103 MG/DL — HIGH (ref 70–99)
HCT VFR BLD CALC: 37.9 % — SIGNIFICANT CHANGE UP (ref 34.5–45)
HGB BLD-MCNC: 12.5 G/DL — SIGNIFICANT CHANGE UP (ref 11.5–15.5)
MCHC RBC-ENTMCNC: 28 PG — SIGNIFICANT CHANGE UP (ref 27–34)
MCHC RBC-ENTMCNC: 33 GM/DL — SIGNIFICANT CHANGE UP (ref 32–36)
MCV RBC AUTO: 85 FL — SIGNIFICANT CHANGE UP (ref 80–100)
NRBC # BLD: 0 /100 WBCS — SIGNIFICANT CHANGE UP (ref 0–0)
PLATELET # BLD AUTO: 336 K/UL — SIGNIFICANT CHANGE UP (ref 150–400)
POTASSIUM SERPL-MCNC: 3.9 MMOL/L — SIGNIFICANT CHANGE UP (ref 3.5–5.3)
POTASSIUM SERPL-SCNC: 3.9 MMOL/L — SIGNIFICANT CHANGE UP (ref 3.5–5.3)
RBC # BLD: 4.46 M/UL — SIGNIFICANT CHANGE UP (ref 3.8–5.2)
RBC # FLD: 11.9 % — SIGNIFICANT CHANGE UP (ref 10.3–14.5)
S PNEUM AG SER QL: SIGNIFICANT CHANGE UP
SODIUM SERPL-SCNC: 139 MMOL/L — SIGNIFICANT CHANGE UP (ref 135–145)
SPECIMEN SOURCE: SIGNIFICANT CHANGE UP
SPECIMEN SOURCE: SIGNIFICANT CHANGE UP
WBC # BLD: 20.23 K/UL — HIGH (ref 3.8–10.5)
WBC # FLD AUTO: 20.23 K/UL — HIGH (ref 3.8–10.5)

## 2019-01-22 PROCEDURE — 99233 SBSQ HOSP IP/OBS HIGH 50: CPT

## 2019-01-22 RX ADMIN — CEFEPIME 100 MILLIGRAM(S): 1 INJECTION, POWDER, FOR SOLUTION INTRAMUSCULAR; INTRAVENOUS at 21:19

## 2019-01-22 RX ADMIN — CEFEPIME 100 MILLIGRAM(S): 1 INJECTION, POWDER, FOR SOLUTION INTRAMUSCULAR; INTRAVENOUS at 13:04

## 2019-01-22 RX ADMIN — Medication 2 MILLIGRAM(S): at 05:25

## 2019-01-22 RX ADMIN — NYSTATIN CREAM 1 APPLICATION(S): 100000 CREAM TOPICAL at 06:06

## 2019-01-22 RX ADMIN — Medication 250 MILLIGRAM(S): at 17:11

## 2019-01-22 RX ADMIN — Medication 250 MILLIGRAM(S): at 05:25

## 2019-01-22 RX ADMIN — CEFEPIME 100 MILLIGRAM(S): 1 INJECTION, POWDER, FOR SOLUTION INTRAMUSCULAR; INTRAVENOUS at 05:25

## 2019-01-22 RX ADMIN — AZITHROMYCIN 500 MILLIGRAM(S): 500 TABLET, FILM COATED ORAL at 11:16

## 2019-01-22 NOTE — PROGRESS NOTE ADULT - SUBJECTIVE AND OBJECTIVE BOX
Patient is a 38y old  Female who presents with a chief complaint of cold (22 Jan 2019 15:15)      INTERVAL HPI / OVERNIGHT EVENTS:     MEDICATIONS  (STANDING):  azithromycin   Tablet 500 milliGRAM(s) Oral daily  cefepime   IVPB 2000 milliGRAM(s) IV Intermittent every 8 hours  enoxaparin Injectable 40 milliGRAM(s) SubCutaneous daily  nystatin Powder 1 Application(s) Topical every 12 hours  saccharomyces boulardii 250 milliGRAM(s) Oral two times a day    MEDICATIONS  (PRN):  acetaminophen   Tablet .. 650 milliGRAM(s) Oral every 6 hours PRN Temp greater or equal to 38C (100.4F), Mild Pain (1 - 3)  loperamide 2 milliGRAM(s) Oral every 6 hours PRN Diarrhea      Vital Signs Last 24 Hrs  T(C): 37.5 (22 Jan 2019 11:32), Max: 37.5 (22 Jan 2019 11:32)  T(F): 99.5 (22 Jan 2019 11:32), Max: 99.5 (22 Jan 2019 11:32)  HR: 118 (22 Jan 2019 11:32) (86 - 118)  BP: 109/67 (22 Jan 2019 11:32) (97/59 - 109/67)  BP(mean): --  RR: 16 (22 Jan 2019 11:32) (16 - 16)  SpO2: 96% (22 Jan 2019 11:32) (96% - 97%)    Review of systems:  General : no fever /chills.fatigue  CVS : no chest pain ,palpitations  Lungs : no shortness of breath,cougg  GI : no abdominal pain,vomiting,diarrhea   : no dysuria,hematuria        PHYSICAL EXAM:  General :NAD  Constitutional:  well-groomed, well-developed  Respiratory: CTAB/L  Cardiovascular: S1 and S2, RRR, no M/G/R  Gastrointestinal: BS+, soft, NT/ND  Extremities: No peripheral edema  Vascular: 2+ peripheral pulses  Skin: No rashes      LABS:                        12.5   20.23 )-----------( 336      ( 22 Jan 2019 06:41 )             37.9     01-22    139  |  107  |  16  ----------------------------<  103<H>  3.9   |  26  |  0.99    Ca    8.5      22 Jan 2019 06:41            MICROBIOLOGY:  RECENT CULTURES:  01-21 .Sputum Sputum XXXX   Rare polymorphonuclear leukocytes per low power field  Rare Squamous epithelial cells per low power field  Few Gram Positive Cocci in Clusters per oil power field  Few Gram Negative Rods per oil power field  Rare Gram Positive Rods per oil power field   Normal Respiratory Cece present    01-20 .Sputum Sputum XXXX   Rare polymorphonuclear leukocytes per low power field  Numerous Squamous epithelial cells per low power field  Moderate Gram Positive Cocci in Pairs and Chains seen per oil power field  Moderate Gram Positive Cocci in Clusters seen per oil power field  Moderate Gram Negative Rods seen per oil power field   Normal Respiratory Cece present    01-17 .Blood Blood-Peripheral XXXX XXXX   No growth at 5 days.          RADIOLOGY & ADDITIONAL STUDIES: Patient is a 38y old  Female who presents with a chief complaint of cold (22 Jan 2019 15:15)      INTERVAL HPI / OVERNIGHT EVENTS: resolving shortness of breath     MEDICATIONS  (STANDING):  azithromycin   Tablet 500 milliGRAM(s) Oral daily  cefepime   IVPB 2000 milliGRAM(s) IV Intermittent every 8 hours  enoxaparin Injectable 40 milliGRAM(s) SubCutaneous daily  nystatin Powder 1 Application(s) Topical every 12 hours  saccharomyces boulardii 250 milliGRAM(s) Oral two times a day    MEDICATIONS  (PRN):  acetaminophen   Tablet .. 650 milliGRAM(s) Oral every 6 hours PRN Temp greater or equal to 38C (100.4F), Mild Pain (1 - 3)  loperamide 2 milliGRAM(s) Oral every 6 hours PRN Diarrhea      Vital Signs Last 24 Hrs  T(C): 37.5 (22 Jan 2019 11:32), Max: 37.5 (22 Jan 2019 11:32)  T(F): 99.5 (22 Jan 2019 11:32), Max: 99.5 (22 Jan 2019 11:32)  HR: 118 (22 Jan 2019 11:32) (86 - 118)  BP: 109/67 (22 Jan 2019 11:32) (97/59 - 109/67)  BP(mean): --  RR: 16 (22 Jan 2019 11:32) (16 - 16)  SpO2: 96% (22 Jan 2019 11:32) (96% - 97%)    Review of systems:  General : no fever /chills, fatigue  CVS : no chest pain ,palpitations  Lungs : + shortness of breath, cough  GI : no abdominal pain, vomiting, diarrhea   : no dysuria, hematuria        PHYSICAL EXAM:  General :NAD  Constitutional: thin built  Respiratory: CTAB/L  Cardiovascular: S1 and S2, RRR, no M/G/R  Gastrointestinal: BS+, soft, NT/ND  Extremities: No peripheral edema  Vascular: 2+ peripheral pulses  Skin: No rashes      LABS:                        12.5   20.23 )-----------( 336      ( 22 Jan 2019 06:41 )             37.9     01-22    139  |  107  |  16  ----------------------------<  103<H>  3.9   |  26  |  0.99    Ca    8.5      22 Jan 2019 06:41    Lactate Dehydrogenase, Serum in AM (01.21.19 @ 08:15)    Lactate Dehydrogenase, Serum: 473 U/L  Fungitell (01.18.19 @ 10:51)    Fungitell: >500: Interpretation: The Fungitell assay does not detect certain fungal  species such as the genus Cryptococcus (Em et al. 1991) which  produces very low levels of (1-3)-Beta-D-Glucan. The assay also does  not detect the Zygomycetes such as Absidia, Mucor and Rhizopus  (Ronnie et al. 1994) which are not known to produce  (1-3)-Beta-D-Glucan. In addition, the yeast phase of Blastomyces  dermatitidis produces little (1-3)-Beta-D-Glucan and may not be  detected by the assay (Alli et al. 2007).  Reference Range:  Less than 60 pg/mL. Glucan values of less than 60 pg/mL are  interpreted as negative.  Glucan values of 60 to 79 pg/mL are interpreted as indeterminate,  and suggest a possible fungal infection. Additional sampling and  testing of sera is required to interpret the results.  Glucan values of greater than or equal to 80 pg/mL are interpreted  as positive.                  MICROBIOLOGY:  RECENT CULTURES:  01-21 .Sputum Sputum XXXX   Rare polymorphonuclear leukocytes per low power field  Rare Squamous epithelial cells per low power field  Few Gram Positive Cocci in Clusters per oil power field  Few Gram Negative Rods per oil power field  Rare Gram Positive Rods per oil power field   Normal Respiratory Cece present    01-20 .Sputum Sputum XXXX   Rare polymorphonuclear leukocytes per low power field  Numerous Squamous epithelial cells per low power field  Moderate Gram Positive Cocci in Pairs and Chains seen per oil power field  Moderate Gram Positive Cocci in Clusters seen per oil power field  Moderate Gram Negative Rods seen per oil power field   Normal Respiratory Cece present    01-17 .Blood Blood-Peripheral XXXX XXXX   No growth at 5 days.          RADIOLOGY & ADDITIONAL STUDIES:

## 2019-01-22 NOTE — PROGRESS NOTE ADULT - ASSESSMENT
38M with NO PMH walked in c/o sob, dry cough bilateral chest pain and headache for two weeks patient cough is nonproductive and with some pleuritic chest pain and short of breath admitted with pneumonia. CT Angio Chest w/ IV Cont no evidence for pulmonary embolism. Patchy airspace opacifications in both lungs may represent pneumonia. Clinical correction is recommended. Nonspecific mildly enlarged right hilar lymph node.

## 2019-01-22 NOTE — PROGRESS NOTE ADULT - PROBLEM SELECTOR PLAN 1
still have leukocytosis  fungitell and LDH high   would like to evaluate for PCP fungal infection in this HIV negative pt   Planned for bronchoscopy with c/s (bacterial/fungal/ AFB)and pathology for fungal and pcp staining as well  Discussed with Brandt Gloria and Sophia  change antibiotics broad spectrum soon if leukocytosis continues to worsen and see if it helps  breathing is improving but not optimal   cont cefepime and azithro

## 2019-01-22 NOTE — PROGRESS NOTE ADULT - PROBLEM SELECTOR PLAN 1
fungitell elevated, legionella negative, streptococcal antigen, tylenol,    infectious disease/pulm on board  azithromycin   Tablet 500 milliGRAM(s) Oral daily  cefepime   IVPB 2000 milliGRAM(s) IV Intermittent every 8 hours    Plan for Bronch 2 PM on Wednesday fungitell elevated, legionella negative, streptococcal antigen, tylenol,    infectious disease/pulm on board  azithromycin   Tablet 500 milliGRAM(s) Oral daily  cefepime   IVPB 2000 milliGRAM(s) IV Intermittent every 8 hours.   Plan for Bronch 2 PM on Wednesday. Will order ACE level in AM to eval   for sarcoidosis given hilar adenopathy and poor response of WBC count to ABX .

## 2019-01-22 NOTE — PROGRESS NOTE ADULT - SUBJECTIVE AND OBJECTIVE BOX
lying in bed    Vital Signs Last 24 Hrs  T(C): 36.3 (22 Jan 2019 06:34), Max: 36.8 (21 Jan 2019 12:17)  T(F): 97.4 (22 Jan 2019 06:34), Max: 98.3 (21 Jan 2019 12:17)  HR: 89 (22 Jan 2019 06:34) (86 - 112)  BP: 105/61 (22 Jan 2019 06:34) (97/59 - 106/67)  BP(mean): --  RR: 16 (22 Jan 2019 06:34) (16 - 17)  SpO2: 97% (22 Jan 2019 06:34) (96% - 97%)    PHYSICAL EXAM:    general - AAO x 3  HEENT - No Icterus  CVS - RRR  RS - AE B/L  Abd - soft, NT  Ext - Pulses +        LABS:                        12.5   20.23 )-----------( 336      ( 22 Jan 2019 06:41 )             37.9     01-22    139  |  107  |  16  ----------------------------<  103<H>  3.9   |  26  |  0.99    Ca    8.5      22 Jan 2019 06:41            Culture - Sputum (collected 21 Jan 2019 09:53)  Source: .Sputum Sputum  Gram Stain (prelim) (22 Jan 2019 09:20):    Rare polymorphonuclear leukocytes per low power field    Rare Squamous epithelial cells per low power field    Few Gram Positive Cocci in Clusters per oil power field    Few Gram Negative Rods per oil power field    Rare Gram Positive Rods per oil power field  Preliminary Report (22 Jan 2019 09:20):    Normal Respiratory Cece present    Culture - Sputum (collected 20 Jan 2019 02:06)  Source: .Sputum Sputum  Gram Stain (21 Jan 2019 19:13):    Rare polymorphonuclear leukocytes per low power field    Numerous Squamous epithelial cells per low power field    Moderate Gram Positive Cocci in Pairs and Chains seen per oil power field    Moderate Gram Positive Cocci in Clusters seen per oil power field    Moderate Gram Negative Rods seen per oil power field  Final Report (21 Jan 2019 19:13):    Normal Respiratory Cece present    Culture - Blood (collected 17 Jan 2019 00:25)  Source: .Blood Blood-Peripheral  Final Report (22 Jan 2019 01:01):    No growth at 5 days.    Culture - Blood (collected 17 Jan 2019 00:25)  Source: .Blood Blood-Peripheral  Final Report (22 Jan 2019 01:01):    No growth at 5 days.        RADIOLOGY & ADDITIONAL STUDIES:

## 2019-01-22 NOTE — PROGRESS NOTE ADULT - SUBJECTIVE AND OBJECTIVE BOX
INTERVAL HPI:   38 years old female walked in c/o sob, dry cough bilateral chest pain and headache for two weeks. Cough is non productive,  associated  with some  pleuritic chest pain and short of breath. Went to urgent center and they gave her naproxen  Denied  recent long traveling or trauma, hx of taking hormone therapy, dizziness, blurred visions, light sensitivities, neck/back pain, focal/distal weakness or numbness, nausea, vomiting,, chills, abd pain, dysuria, hematuria, vaginal spotting or discharge or irregular bowel movements. Pt sts she is not at risk of being pregnant.  Denies smoking but reports 2nd hand exposure from her partner.   Admitted with multifocal pneumonia and leukocytosis.    OVERNIGHT EVENTS:  Awake, comfortable. Still with exertional sob.    Vital Signs Last 24 Hrs  T(C): 37.5 (22 Jan 2019 11:32), Max: 37.5 (22 Jan 2019 11:32)  T(F): 99.5 (22 Jan 2019 11:32), Max: 99.5 (22 Jan 2019 11:32)  HR: 118 (22 Jan 2019 11:32) (86 - 118)  BP: 109/67 (22 Jan 2019 11:32) (97/59 - 109/67)  BP(mean): --  RR: 16 (22 Jan 2019 11:32) (16 - 16)  SpO2: 96% (22 Jan 2019 11:32) (96% - 97%)    PHYSICAL EXAM:  GEN:         Awake, responsive and comfortable.  HEENT:    Normal.    RESP:       no wheezing.  CVS:          Regular rate and rhythm.   ABD:         Soft, non-tender, non-distended;     MEDICATIONS  (STANDING):  azithromycin   Tablet 500 milliGRAM(s) Oral daily  cefepime   IVPB 2000 milliGRAM(s) IV Intermittent every 8 hours  enoxaparin Injectable 40 milliGRAM(s) SubCutaneous daily  nystatin Powder 1 Application(s) Topical every 12 hours  saccharomyces boulardii 250 milliGRAM(s) Oral two times a day    MEDICATIONS  (PRN):  acetaminophen   Tablet .. 650 milliGRAM(s) Oral every 6 hours PRN Temp greater or equal to 38C (100.4F), Mild Pain (1 - 3)  loperamide 2 milliGRAM(s) Oral every 6 hours PRN Diarrhea    LABS:                        12.5   20.23 )-----------( 336      ( 22 Jan 2019 06:41 )             37.9     01-22    139  |  107  |  16  ----------------------------<  103<H>  3.9   |  26  |  0.99    Ca    8.5      22 Jan 2019 06:41    01-19 @ 09:42  pH: 7.46  pCO2: 32  pO2: 93  SaO2: 97    ASSESSMENT AND PLAN:  ·	SOB with exertion.  ·	Multifocal lung infiltrates.  ·	Leukocytosis.  ·	High WBC, LDH, sedrate and Fungitell.  ·	Negative HIV.    On empiric antibiotics. Came with fever, exertional SOB, has bilateral  lung infiltrates, leukocytosis, high sed rate & LDH, normal RF and PIETER is pending.   ABG on room air is normal.  Being planned for Bronchoscopy with washing and transbronchial biopsy.   Please   send bronchial washing for cell count with diff,   gram stain & culture, fungus culture, AFB smear and culture, PCP,  and Cytology.  Discussed with Dr. Paz INTERVAL HPI:   38 years old female walked in c/o sob, dry cough bilateral chest pain and headache for two weeks. Cough is non productive,  associated  with some  pleuritic chest pain and short of breath. Went to urgent center and they gave her naproxen  Denied  recent long traveling or trauma, hx of taking hormone therapy, dizziness, blurred visions, light sensitivities, neck/back pain, focal/distal weakness or numbness, nausea, vomiting,, chills, abd pain, dysuria, hematuria, vaginal spotting or discharge or irregular bowel movements. Pt sts she is not at risk of being pregnant.  Denies smoking but reports 2nd hand exposure from her partner.   Admitted with multifocal pneumonia and leukocytosis.    OVERNIGHT EVENTS:  Awake, comfortable. Still with exertional sob.    Vital Signs Last 24 Hrs  T(C): 37.5 (22 Jan 2019 11:32), Max: 37.5 (22 Jan 2019 11:32)  T(F): 99.5 (22 Jan 2019 11:32), Max: 99.5 (22 Jan 2019 11:32)  HR: 118 (22 Jan 2019 11:32) (86 - 118)  BP: 109/67 (22 Jan 2019 11:32) (97/59 - 109/67)  BP(mean): --  RR: 16 (22 Jan 2019 11:32) (16 - 16)  SpO2: 96% (22 Jan 2019 11:32) (96% - 97%)    PHYSICAL EXAM:  GEN:         Awake, responsive and comfortable.  HEENT:    Normal.    RESP:       no wheezing.  CVS:          Regular rate and rhythm.   ABD:         Soft, non-tender, non-distended;     MEDICATIONS  (STANDING):  azithromycin   Tablet 500 milliGRAM(s) Oral daily  cefepime   IVPB 2000 milliGRAM(s) IV Intermittent every 8 hours  enoxaparin Injectable 40 milliGRAM(s) SubCutaneous daily  nystatin Powder 1 Application(s) Topical every 12 hours  saccharomyces boulardii 250 milliGRAM(s) Oral two times a day    MEDICATIONS  (PRN):  acetaminophen   Tablet .. 650 milliGRAM(s) Oral every 6 hours PRN Temp greater or equal to 38C (100.4F), Mild Pain (1 - 3)  loperamide 2 milliGRAM(s) Oral every 6 hours PRN Diarrhea    LABS:                        12.5   20.23 )-----------( 336      ( 22 Jan 2019 06:41 )             37.9     01-22    139  |  107  |  16  ----------------------------<  103<H>  3.9   |  26  |  0.99    Ca    8.5      22 Jan 2019 06:41    01-19 @ 09:42  pH: 7.46  pCO2: 32  pO2: 93  SaO2: 97    ASSESSMENT AND PLAN:  ·	SOB with exertion.  ·	Multifocal lung infiltrates.  ·	Leukocytosis.  ·	High WBC, LDH, sedrate and Fungitell.  ·	Negative HIV.    On empiric antibiotics. Came with fever, exertional SOB, has bilateral  lung infiltrates, leukocytosis, high sed rate & LDH, normal RF and PIETER is pending.   ABG on room air is normal.  Being planned for Bronchoscopy with washing and transbronchial biopsy.   Please   send bronchial washing for cell count with diff,   gram stain & culture, fungus culture, AFB smear and culture, PCP,  and Cytology.  Discussed with Dr. Paz  Will follow PIETER and send ACE level.

## 2019-01-22 NOTE — PROGRESS NOTE ADULT - SUBJECTIVE AND OBJECTIVE BOX
INTERVAL HPI/OVERNIGHT EVENTS:  Pt seen and examined at bedside.     Allergies/Intolerance: No Known Allergies      MEDICATIONS  (STANDING):  azithromycin   Tablet 500 milliGRAM(s) Oral daily  cefepime   IVPB 2000 milliGRAM(s) IV Intermittent every 8 hours  enoxaparin Injectable 40 milliGRAM(s) SubCutaneous daily  nystatin Powder 1 Application(s) Topical every 12 hours  saccharomyces boulardii 250 milliGRAM(s) Oral two times a day    MEDICATIONS  (PRN):  acetaminophen   Tablet .. 650 milliGRAM(s) Oral every 6 hours PRN Temp greater or equal to 38C (100.4F), Mild Pain (1 - 3)  loperamide 2 milliGRAM(s) Oral every 6 hours PRN Diarrhea        ROS: all systems reviewed and wnl      PHYSICAL EXAMINATION:  Vital Signs Last 24 Hrs  T(C): 37.5 (22 Jan 2019 11:32), Max: 37.5 (22 Jan 2019 11:32)  T(F): 99.5 (22 Jan 2019 11:32), Max: 99.5 (22 Jan 2019 11:32)  HR: 118 (22 Jan 2019 11:32) (86 - 118)  BP: 109/67 (22 Jan 2019 11:32) (97/59 - 109/67)  BP(mean): --  RR: 16 (22 Jan 2019 11:32) (16 - 16)  SpO2: 96% (22 Jan 2019 11:32) (96% - 97%)  CAPILLARY BLOOD GLUCOSE          01-21 @ 07:01  -  01-22 @ 07:00  --------------------------------------------------------  IN: 780 mL / OUT: 0 mL / NET: 780 mL        GENERAL:   NECK: supple, No JVD  CHEST/LUNG: clear to auscultation bilaterally; no rales, rhonchi, or wheezing b/l  HEART: normal S1, S2  ABDOMEN: BS+, soft, ND, NT   EXTREMITIES:  pulses palpable; no clubbing, cyanosis, or edema b/l LEs  SKIN: no rashes or lesions      LABS:                        12.5   20.23 )-----------( 336      ( 22 Jan 2019 06:41 )             37.9     01-22    139  |  107  |  16  ----------------------------<  103<H>  3.9   |  26  |  0.99    Ca    8.5      22 Jan 2019 06:41 INTERVAL HPI/OVERNIGHT EVENTS:  Pt seen and examined at bedside.     Allergies/Intolerance: No Known Allergies      MEDICATIONS  (STANDING):  azithromycin   Tablet 500 milliGRAM(s) Oral daily  cefepime   IVPB 2000 milliGRAM(s) IV Intermittent every 8 hours  enoxaparin Injectable 40 milliGRAM(s) SubCutaneous daily  nystatin Powder 1 Application(s) Topical every 12 hours  saccharomyces boulardii 250 milliGRAM(s) Oral two times a day    MEDICATIONS  (PRN):  acetaminophen   Tablet .. 650 milliGRAM(s) Oral every 6 hours PRN Temp greater or equal to 38C (100.4F), Mild Pain (1 - 3)  loperamide 2 milliGRAM(s) Oral every 6 hours PRN Diarrhea        ROS: all systems reviewed and wnl      PHYSICAL EXAMINATION:  Vital Signs Last 24 Hrs  T(C): 37.5 (22 Jan 2019 11:32), Max: 37.5 (22 Jan 2019 11:32)  T(F): 99.5 (22 Jan 2019 11:32), Max: 99.5 (22 Jan 2019 11:32)  HR: 118 (22 Jan 2019 11:32) (86 - 118)  BP: 109/67 (22 Jan 2019 11:32) (97/59 - 109/67)  BP(mean): --  RR: 16 (22 Jan 2019 11:32) (16 - 16)  SpO2: 96% (22 Jan 2019 11:32) (96% - 97%)  CAPILLARY BLOOD GLUCOSE          01-21 @ 07:01  -  01-22 @ 07:00  --------------------------------------------------------  IN: 780 mL / OUT: 0 mL / NET: 780 mL        GENERAL: stable in bed, comfortable on RA, no fevers for 24 hours, non-bloody diarrhea  NECK: supple, No JVD  CHEST/LUNG: clear to auscultation bilaterally; no rales, rhonchi, or wheezing b/l  HEART: normal S1, S2  ABDOMEN: BS+, soft, ND, NT   EXTREMITIES:  pulses palpable; no clubbing, cyanosis, or edema b/l LEs  SKIN: no rashes or lesions      LABS:                        12.5   20.23 )-----------( 336      ( 22 Jan 2019 06:41 )             37.9     01-22    139  |  107  |  16  ----------------------------<  103<H>  3.9   |  26  |  0.99    Ca    8.5      22 Jan 2019 06:41

## 2019-01-23 DIAGNOSIS — R19.7 DIARRHEA, UNSPECIFIED: ICD-10-CM

## 2019-01-23 LAB
4/8 RATIO: 18.54 RATIO — HIGH (ref 0.9–3.6)
ABS CD8: 417 /UL — SIGNIFICANT CHANGE UP (ref 142–740)
ANA TITR SER: NEGATIVE — SIGNIFICANT CHANGE UP
ANION GAP SERPL CALC-SCNC: 9 MMOL/L — SIGNIFICANT CHANGE UP (ref 5–17)
APTT BLD: 29.9 SEC — SIGNIFICANT CHANGE UP (ref 28.5–37)
BLD GP AB SCN SERPL QL: SIGNIFICANT CHANGE UP
BUN SERPL-MCNC: 17 MG/DL — SIGNIFICANT CHANGE UP (ref 7–23)
CALCIUM SERPL-MCNC: 9 MG/DL — SIGNIFICANT CHANGE UP (ref 8.5–10.1)
CD16+CD56+ CELLS NFR BLD: 4 % — LOW (ref 5–23)
CD16+CD56+ CELLS NFR SPEC: 402 /UL — SIGNIFICANT CHANGE UP (ref 71–410)
CD19 BLASTS SPEC-ACNC: 395 /UL — SIGNIFICANT CHANGE UP (ref 84–469)
CD19 BLASTS SPEC-ACNC: 4 % — LOW (ref 6–24)
CD3 BLASTS SPEC-ACNC: 8453 /UL — HIGH (ref 672–1870)
CD3 BLASTS SPEC-ACNC: 88 % — HIGH (ref 59–83)
CD4 %: 82 % — HIGH (ref 30–62)
CD8 %: 4 % — LOW (ref 12–36)
CHLORIDE SERPL-SCNC: 105 MMOL/L — SIGNIFICANT CHANGE UP (ref 96–108)
CO2 SERPL-SCNC: 24 MMOL/L — SIGNIFICANT CHANGE UP (ref 22–31)
CREAT SERPL-MCNC: 1 MG/DL — SIGNIFICANT CHANGE UP (ref 0.5–1.3)
CULTURE RESULTS: SIGNIFICANT CHANGE UP
GLUCOSE SERPL-MCNC: 77 MG/DL — SIGNIFICANT CHANGE UP (ref 70–99)
GRAM STN FLD: SIGNIFICANT CHANGE UP
HCT VFR BLD CALC: 40.5 % — SIGNIFICANT CHANGE UP (ref 34.5–45)
HGB BLD-MCNC: 13.2 G/DL — SIGNIFICANT CHANGE UP (ref 11.5–15.5)
IGA FLD-MCNC: 380 MG/DL — SIGNIFICANT CHANGE UP (ref 84–499)
IGG FLD-MCNC: 1435 MG/DL — SIGNIFICANT CHANGE UP (ref 610–1660)
IGM SERPL-MCNC: 69 MG/DL — SIGNIFICANT CHANGE UP (ref 35–242)
INR BLD: 1.24 RATIO — HIGH (ref 0.88–1.16)
KAPPA LC SER QL IFE: 2.31 MG/DL — HIGH (ref 0.33–1.94)
KAPPA/LAMBDA FREE LIGHT CHAIN RATIO, SERUM: 1.22 RATIO — SIGNIFICANT CHANGE UP (ref 0.26–1.65)
LAMBDA LC SER QL IFE: 1.9 MG/DL — SIGNIFICANT CHANGE UP (ref 0.57–2.63)
MCHC RBC-ENTMCNC: 27.6 PG — SIGNIFICANT CHANGE UP (ref 27–34)
MCHC RBC-ENTMCNC: 32.6 GM/DL — SIGNIFICANT CHANGE UP (ref 32–36)
MCV RBC AUTO: 84.6 FL — SIGNIFICANT CHANGE UP (ref 80–100)
NRBC # BLD: 0 /100 WBCS — SIGNIFICANT CHANGE UP (ref 0–0)
PLATELET # BLD AUTO: 345 K/UL — SIGNIFICANT CHANGE UP (ref 150–400)
POTASSIUM SERPL-MCNC: 4 MMOL/L — SIGNIFICANT CHANGE UP (ref 3.5–5.3)
POTASSIUM SERPL-SCNC: 4 MMOL/L — SIGNIFICANT CHANGE UP (ref 3.5–5.3)
PROCALCITONIN SERPL-MCNC: 0.08 NG/ML — SIGNIFICANT CHANGE UP (ref 0.02–0.1)
PROTHROM AB SERPL-ACNC: 14 SEC — HIGH (ref 10–12.9)
RBC # BLD: 4.79 M/UL — SIGNIFICANT CHANGE UP (ref 3.8–5.2)
RBC # FLD: 11.7 % — SIGNIFICANT CHANGE UP (ref 10.3–14.5)
SODIUM SERPL-SCNC: 138 MMOL/L — SIGNIFICANT CHANGE UP (ref 135–145)
SPECIMEN SOURCE: SIGNIFICANT CHANGE UP
T-CELL CD4 SUBSET PNL BLD: 7733 /UL — HIGH (ref 489–1457)
WBC # BLD: 21.9 K/UL — HIGH (ref 3.8–10.5)
WBC # FLD AUTO: 21.9 K/UL — HIGH (ref 3.8–10.5)

## 2019-01-23 PROCEDURE — 99233 SBSQ HOSP IP/OBS HIGH 50: CPT

## 2019-01-23 RX ORDER — SODIUM CHLORIDE 9 MG/ML
1000 INJECTION, SOLUTION INTRAVENOUS
Qty: 0 | Refills: 0 | Status: DISCONTINUED | OUTPATIENT
Start: 2019-01-23 | End: 2019-01-23

## 2019-01-23 RX ORDER — FENTANYL CITRATE 50 UG/ML
25 INJECTION INTRAVENOUS
Qty: 0 | Refills: 0 | Status: DISCONTINUED | OUTPATIENT
Start: 2019-01-23 | End: 2019-01-23

## 2019-01-23 RX ORDER — MEROPENEM 1 G/30ML
1000 INJECTION INTRAVENOUS EVERY 8 HOURS
Qty: 0 | Refills: 0 | Status: DISCONTINUED | OUTPATIENT
Start: 2019-01-23 | End: 2019-01-24

## 2019-01-23 RX ORDER — ACETAMINOPHEN 500 MG
650 TABLET ORAL EVERY 6 HOURS
Qty: 0 | Refills: 0 | Status: DISCONTINUED | OUTPATIENT
Start: 2019-01-23 | End: 2019-01-23

## 2019-01-23 RX ORDER — ENOXAPARIN SODIUM 100 MG/ML
40 INJECTION SUBCUTANEOUS DAILY
Qty: 0 | Refills: 0 | Status: DISCONTINUED | OUTPATIENT
Start: 2019-01-23 | End: 2019-01-23

## 2019-01-23 RX ORDER — VANCOMYCIN HCL 1 G
1000 VIAL (EA) INTRAVENOUS EVERY 12 HOURS
Qty: 0 | Refills: 0 | Status: DISCONTINUED | OUTPATIENT
Start: 2019-01-23 | End: 2019-01-23

## 2019-01-23 RX ORDER — ACETAMINOPHEN 500 MG
650 TABLET ORAL ONCE
Qty: 0 | Refills: 0 | Status: COMPLETED | OUTPATIENT
Start: 2019-01-23 | End: 2019-01-23

## 2019-01-23 RX ORDER — SACCHAROMYCES BOULARDII 250 MG
250 POWDER IN PACKET (EA) ORAL
Qty: 0 | Refills: 0 | Status: DISCONTINUED | OUTPATIENT
Start: 2019-01-23 | End: 2019-01-25

## 2019-01-23 RX ORDER — FENTANYL CITRATE 50 UG/ML
50 INJECTION INTRAVENOUS
Qty: 0 | Refills: 0 | Status: DISCONTINUED | OUTPATIENT
Start: 2019-01-23 | End: 2019-01-23

## 2019-01-23 RX ORDER — MEROPENEM 1 G/30ML
1000 INJECTION INTRAVENOUS EVERY 8 HOURS
Qty: 0 | Refills: 0 | Status: DISCONTINUED | OUTPATIENT
Start: 2019-01-23 | End: 2019-01-23

## 2019-01-23 RX ORDER — ONDANSETRON 8 MG/1
4 TABLET, FILM COATED ORAL ONCE
Qty: 0 | Refills: 0 | Status: DISCONTINUED | OUTPATIENT
Start: 2019-01-23 | End: 2019-01-23

## 2019-01-23 RX ORDER — LOPERAMIDE HCL 2 MG
2 TABLET ORAL EVERY 6 HOURS
Qty: 0 | Refills: 0 | Status: DISCONTINUED | OUTPATIENT
Start: 2019-01-23 | End: 2019-01-25

## 2019-01-23 RX ORDER — ENOXAPARIN SODIUM 100 MG/ML
40 INJECTION SUBCUTANEOUS DAILY
Qty: 0 | Refills: 0 | Status: DISCONTINUED | OUTPATIENT
Start: 2019-01-24 | End: 2019-01-25

## 2019-01-23 RX ORDER — NYSTATIN CREAM 100000 [USP'U]/G
1 CREAM TOPICAL EVERY 12 HOURS
Qty: 0 | Refills: 0 | Status: DISCONTINUED | OUTPATIENT
Start: 2019-01-23 | End: 2019-01-25

## 2019-01-23 RX ORDER — SODIUM CHLORIDE 9 MG/ML
1000 INJECTION, SOLUTION INTRAVENOUS
Qty: 0 | Refills: 0 | Status: DISCONTINUED | OUTPATIENT
Start: 2019-01-23 | End: 2019-01-25

## 2019-01-23 RX ORDER — VANCOMYCIN HCL 1 G
1000 VIAL (EA) INTRAVENOUS EVERY 12 HOURS
Qty: 0 | Refills: 0 | Status: DISCONTINUED | OUTPATIENT
Start: 2019-01-23 | End: 2019-01-24

## 2019-01-23 RX ADMIN — MEROPENEM 100 MILLIGRAM(S): 1 INJECTION INTRAVENOUS at 21:41

## 2019-01-23 RX ADMIN — Medication 250 MILLIGRAM(S): at 18:17

## 2019-01-23 RX ADMIN — MEROPENEM 100 MILLIGRAM(S): 1 INJECTION INTRAVENOUS at 13:01

## 2019-01-23 RX ADMIN — Medication 250 MILLIGRAM(S): at 05:47

## 2019-01-23 RX ADMIN — CEFEPIME 100 MILLIGRAM(S): 1 INJECTION, POWDER, FOR SOLUTION INTRAMUSCULAR; INTRAVENOUS at 05:47

## 2019-01-23 RX ADMIN — Medication 250 MILLIGRAM(S): at 17:18

## 2019-01-23 RX ADMIN — NYSTATIN CREAM 1 APPLICATION(S): 100000 CREAM TOPICAL at 05:47

## 2019-01-23 RX ADMIN — SODIUM CHLORIDE 100 MILLILITER(S): 9 INJECTION, SOLUTION INTRAVENOUS at 17:18

## 2019-01-23 RX ADMIN — NYSTATIN CREAM 1 APPLICATION(S): 100000 CREAM TOPICAL at 17:18

## 2019-01-23 NOTE — DIETITIAN INITIAL EVALUATION ADULT. - ENERGY NEEDS
Height (cm): 162.56 (01-16)  Weight (kg): 55.3 (01-16)  BMI (kg/m2): 20.9 (01-16)  IBW: 54.4 kg         % IBW:  101%           UBW:  47.6 kg           %UBW: 116%, pt c wt. loss of 0.1 kg/0.2% wt. loss since adm

## 2019-01-23 NOTE — BRIEF OPERATIVE NOTE - POST-OP DX
Interstitial lung disease  01/23/2019    Active  Gail Carlton  Pneumonia  01/23/2019    Active  Gail Carlton

## 2019-01-23 NOTE — DIETITIAN INITIAL EVALUATION ADULT. - OTHER INFO
Pt seen for length of stay.  Pt reports that she lost wt. since adm as she is eating less than normal intake.  Pt c % of meal consumption as per documentation.

## 2019-01-23 NOTE — DIETITIAN INITIAL EVALUATION ADULT. - PERTINENT LABORATORY DATA
01-23  Hgb 13.2 g/dL Hct 40.5 % 01-23 Na138 mmol/L Glu 77 mg/dL K+ 4.0 mmol/L Cr  1.00 mg/dL BUN 17 mg/dL 01-16 Alb 2.7 g/dL<L>01-16 ALT 27 U/L AST 42 U/L<H> Alkaline Phosphatase 109 U/L

## 2019-01-23 NOTE — PROGRESS NOTE ADULT - ASSESSMENT
38M with NO PMH walked in c/o sob, dry cough bilateral chest pain and headache for two weeks patient cough is nonproductive and with some pleuritic chest pain and short of breath admitted with pneumonia. CT Angio Chest w/ IV Cont no evidence for pulmonary embolism. Patchy airspace opacifications in both lungs may represent pneumonia. Clinical correction is recommended. Nonspecific mildly enlarged right hilar lymph node.       Pneumonia of both lungs due to other aerobic gram-negative bacteria w/ sepsis POA.    - fungitell elevated, legionella negative, streptococcal antigen, tylenol,    infectious disease/pulm on board  azithromycin   Tablet 500 milliGRAM(s) Oral daily  cefepime   IVPB 2000 milliGRAM(s) IV Intermittent every 8 hours.   Plan for Bronch 2 PM on Wednesday. Will order ACE level in AM to eval   for sarcoidosis given hilar adenopathy and poor response of WBC count to ABX .  Problem/Plan - 3:  ·  Problem: Lymphocytosis.  Plan: enlarged ln on ct, pt says she has a strong family hx of leukemia   heme consult appreciated, elevated lymphocytes on diff noted. 38M yo F with NO PMH who p/w pneumonia.   - CT Angio Chest w/ IV Cont  on 1/16 showed no evidence for pulmonary embolism. There was patchy airspace opacifications in both lungs may represent pneumonia w/ a mildly enlarged right hilar lymph node. Follow up CT on 1/20 showed extensive bilateral lower lobe opacities  - due to gram-negative/positive bacteria w/ sepsis POA     - WBC going up  - fungitell elevated, legionella negative, streptococcal antigen neg  - ID & pulmonary on board  - c/w Merrem & vanc  - Bronch today with brush biopsy - will check gram stain & culture, fungus culture, AFB smear and culture, PCP, and Cytology   - ACE level pending to evaluate for sarcoidosis given hilar adenopathy and poor response of WBC count to ABX   - heme/onc consulted for lymphocytosis as LN enlarged on ct, pt reported a strong family hx of leukemia and has elevated lymphocytes on diff -  flow cytometry done  - C. diff negative  - HIV negative  - , CRP 2.29   - RF & PIETER neg  - procalcitonin normal    Prophylaxis:  DVT: Lovenox  GI: PO diet

## 2019-01-23 NOTE — PROGRESS NOTE ADULT - SUBJECTIVE AND OBJECTIVE BOX
Patient is a 38y old  Female who presents with a chief complaint of cold (23 Jan 2019 19:14)      INTERVAL HPI/OVERNIGHT EVENTS:    MEDICATIONS  (STANDING):  meropenem  IVPB 1000 milliGRAM(s) IV Intermittent every 8 hours  nystatin Powder 1 Application(s) Topical every 12 hours  saccharomyces boulardii 250 milliGRAM(s) Oral two times a day  sodium chloride 0.45%. 1000 milliLiter(s) (100 mL/Hr) IV Continuous <Continuous>  vancomycin  IVPB 1000 milliGRAM(s) IV Intermittent every 12 hours    MEDICATIONS  (PRN):  loperamide 2 milliGRAM(s) Oral every 6 hours PRN Diarrhea  promethazine 25 milliGRAM(s) Oral three times a day PRN cough      Allergies    No Known Allergies    Intolerances        Vital Signs Last 24 Hrs  T(C): 36.2 (23 Jan 2019 17:08), Max: 37 (23 Jan 2019 02:10)  T(F): 97.2 (23 Jan 2019 17:08), Max: 98.6 (23 Jan 2019 02:10)  HR: 107 (23 Jan 2019 17:08) (87 - 120)  BP: 102/45 (23 Jan 2019 17:08) (102/45 - 116/74)  BP(mean): --  RR: 17 (23 Jan 2019 17:08) (16 - 21)  SpO2: 97% (23 Jan 2019 17:08) (97% - 100%)    PHYSICAL EXAM:  GENERAL: NAD, well-groomed, well-developed  HEAD:  Atraumatic, Normocephalic  EYES: EOMI, PERRLA, conjunctiva and sclera clear  ENMT: No tonsillar erythema, exudates, or enlargement; Moist mucous membranes, Good dentition, No lesions  NECK: Supple, No JVD, Normal thyroid  NERVOUS SYSTEM:  Alert & Oriented X3, Good concentration; Motor Strength 5/5 B/L upper and lower extremities; DTRs 2+ intact and symmetric  CHEST/LUNG: Clear to auscultation bilaterally; No rales, rhonchi, wheezing, or rubs  HEART: Regular rate and rhythm; No murmurs, rubs, or gallops  ABDOMEN: Soft, Nontender, Nondistended; Bowel sounds present  EXTREMITIES:  2+ Peripheral Pulses, No clubbing, cyanosis, or edema  LYMPH: No lymphadenopathy noted  SKIN: No rashes or lesions    LABS:                        13.2   21.90 )-----------( 345      ( 23 Jan 2019 06:34 )             40.5     01-23    138  |  105  |  17  ----------------------------<  77  4.0   |  24  |  1.00    Ca    9.0      23 Jan 2019 06:34      PT/INR - ( 23 Jan 2019 06:34 )   PT: 14.0 sec;   INR: 1.24 ratio         PTT - ( 23 Jan 2019 06:34 )  PTT:29.9 sec    CAPILLARY BLOOD GLUCOSE          Culture - Sputum (collected 21 Jan 2019 09:53)  Source: .Sputum Sputum  Gram Stain (23 Jan 2019 11:10):    Rare polymorphonuclear leukocytes per low power field    Rare Squamous epithelial cells per low power field    Few Gram Positive Cocci in Clusters per oil power field    Few Gram Negative Rods per oil power field    Rare Gram Positive Rods per oil power field  Final Report (23 Jan 2019 11:10):    Normal Respiratory Cece present    Culture - Sputum (collected 20 Jan 2019 02:06)  Source: .Sputum Sputum  Gram Stain (21 Jan 2019 19:13):    Rare polymorphonuclear leukocytes per low power field    Numerous Squamous epithelial cells per low power field    Moderate Gram Positive Cocci in Pairs and Chains seen per oil power field    Moderate Gram Positive Cocci in Clusters seen per oil power field    Moderate Gram Negative Rods seen per oil power field  Final Report (21 Jan 2019 19:13):    Normal Respiratory Cece present      RADIOLOGY & ADDITIONAL TESTS:    01-22-19 @ 07:01 - 01-23-19 @ 07:00  --------------------------------------------------------  IN:    Solution: 50 mL  Total IN: 50 mL    OUT:  Total OUT: 0 mL    Total NET: 50 mL      01-23-19 @ 07:01 - 01-23-19 @ 21:14  --------------------------------------------------------  IN:    lactated ringers.: 100 mL    sodium chloride 0.45%.: 200 mL    Solution: 250 mL  Total IN: 550 mL    OUT:  Total OUT: 0 mL    Total NET: 550 mL 38M yo F with NO PMH who p/w pneumonia. She is lying in bed in NAD.    MEDICATIONS  (STANDING):  meropenem  IVPB 1000 milliGRAM(s) IV Intermittent every 8 hours  nystatin Powder 1 Application(s) Topical every 12 hours  saccharomyces boulardii 250 milliGRAM(s) Oral two times a day  sodium chloride 0.45%. 1000 milliLiter(s) (100 mL/Hr) IV Continuous <Continuous>  vancomycin  IVPB 1000 milliGRAM(s) IV Intermittent every 12 hours    MEDICATIONS  (PRN):  loperamide 2 milliGRAM(s) Oral every 6 hours PRN Diarrhea  promethazine 25 milliGRAM(s) Oral three times a day PRN cough      Allergies    No Known Allergies    Intolerances        Vital Signs Last 24 Hrs  T(C): 36.2 (23 Jan 2019 17:08), Max: 37 (23 Jan 2019 02:10)  T(F): 97.2 (23 Jan 2019 17:08), Max: 98.6 (23 Jan 2019 02:10)  HR: 107 (23 Jan 2019 17:08) (87 - 120)  BP: 102/45 (23 Jan 2019 17:08) (102/45 - 116/74)  BP(mean): --  RR: 17 (23 Jan 2019 17:08) (16 - 21)  SpO2: 97% (23 Jan 2019 17:08) (97% - 100%)    PHYSICAL EXAM:  GENERAL: NAD, well-groomed, well-developed  HEAD:  Atraumatic, Normocephalic  EYES: EOMI, PERRLA   NECK: Supple   NERVOUS SYSTEM:  Alert & Oriented X3, Good concentration   CHEST/LUNG: Clear to auscultation bilaterally; No rales, rhonchi, wheezing, or rubs  HEART: Regular rate and rhythm; No murmurs, rubs, or gallops  ABDOMEN: Soft, Nontender, Nondistended; Bowel sounds present  EXTREMITIES: No clubbing, cyanosis, or edema    LABS:                        13.2   21.90 )-----------( 345      ( 23 Jan 2019 06:34 )             40.5     01-23    138  |  105  |  17  ----------------------------<  77  4.0   |  24  |  1.00    Ca    9.0      23 Jan 2019 06:34      PT/INR - ( 23 Jan 2019 06:34 )   PT: 14.0 sec;   INR: 1.24 ratio         PTT - ( 23 Jan 2019 06:34 )  PTT:29.9 sec    CAPILLARY BLOOD GLUCOSE          Culture - Sputum (collected 21 Jan 2019 09:53)  Source: .Sputum Sputum  Gram Stain (23 Jan 2019 11:10):    Rare polymorphonuclear leukocytes per low power field    Rare Squamous epithelial cells per low power field    Few Gram Positive Cocci in Clusters per oil power field    Few Gram Negative Rods per oil power field    Rare Gram Positive Rods per oil power field  Final Report (23 Jan 2019 11:10):    Normal Respiratory Cece present    Culture - Sputum (collected 20 Jan 2019 02:06)  Source: .Sputum Sputum  Gram Stain (21 Jan 2019 19:13):    Rare polymorphonuclear leukocytes per low power field    Numerous Squamous epithelial cells per low power field    Moderate Gram Positive Cocci in Pairs and Chains seen per oil power field    Moderate Gram Positive Cocci in Clusters seen per oil power field    Moderate Gram Negative Rods seen per oil power field  Final Report (21 Jan 2019 19:13):    Normal Respiratory Cece present      RADIOLOGY & ADDITIONAL TESTS:    01-22-19 @ 07:01 - 01-23-19 @ 07:00  --------------------------------------------------------  IN:    Solution: 50 mL  Total IN: 50 mL    OUT:  Total OUT: 0 mL    Total NET: 50 mL      01-23-19 @ 07:01 - 01-23-19 @ 21:14  --------------------------------------------------------  IN:    lactated ringers.: 100 mL    sodium chloride 0.45%.: 200 mL    Solution: 250 mL  Total IN: 550 mL    OUT:  Total OUT: 0 mL    Total NET: 550 mL

## 2019-01-23 NOTE — PROGRESS NOTE ADULT - SUBJECTIVE AND OBJECTIVE BOX
Pt admitted with  sob, dry cough bilateral chest pain and headache for two weeks. Admitted with multifocal pneumonia and leukocytosis.    OVERNIGHT EVENTS:  Awake, comfortable. Still with exertional sob.    Vital Signs Last 24 Hrs  T(C): 37.5   T(F): 99.5 (  HR: 118  BP: 109/67   BP(mean): --  RR: 16   SpO2: 96%    PHYSICAL EXAM:  GEN:         Awake, responsive and comfortable.  HEENT:    Normal.    RESP:       no wheezing.  CVS:          Regular rate and rhythm.   ABD:         Soft, non-tender, non-distended;     MEDICATIONS  (STANDING):  azithromycin   Tablet 500 milliGRAM(s) Oral daily  cefepime   IVPB 2000 milliGRAM(s) IV Intermittent every 8 hours  enoxaparin Injectable 40 milliGRAM(s) SubCutaneous daily  nystatin Powder 1 Application(s) Topical every 12 hours  saccharomyces boulardii 250 milliGRAM(s) Oral two times a day    MEDICATIONS  (PRN):  acetaminophen   Tablet .. 650 milliGRAM(s) Oral every 6 hours PRN Temp greater or equal to 38C (100.4F), Mild Pain (1 - 3)  loperamide 2 milliGRAM(s) Oral every 6 hours PRN Diarrhea    LABS:                        12.5   20.23 )-----------( 336      ( 22 Jan 2019 06:41 )             37.9     01-22    139  |  107  |  16  ----------------------------<  103<H>  3.9   |  26  |  0.99    Ca    8.5      22 Jan 2019 06:41    01-19 @ 09:42  pH: 7.46  pCO2: 32  pO2: 93  SaO2: 97

## 2019-01-23 NOTE — BRIEF OPERATIVE NOTE - PROCEDURE
<<-----Click on this checkbox to enter Procedure Bronchoscopy (fiberoptic) (rigid) with brush biopsy of "lung"  01/23/2019    Active  BEATRIZ

## 2019-01-23 NOTE — PROGRESS NOTE ADULT - SUBJECTIVE AND OBJECTIVE BOX
INTERVAL HPI:  38 years old female walked in c/o sob, dry cough bilateral chest pain and headache for two weeks. Cough is non productive,  associated  with some  pleuritic chest pain and short of breath. Went to urgent center and they gave her naproxen  Denied  recent long traveling or trauma, hx of taking hormone therapy, dizziness, blurred visions, light sensitivities, neck/back pain, focal/distal weakness or numbness, nausea, vomiting,, chills, abd pain, dysuria, hematuria, vaginal spotting or discharge or irregular bowel movements. Pt sts she is not at risk of being pregnant.  Denies smoking but reports 2nd hand exposure from her partner.   Admitted with multifocal pneumonia and leukocytosis.  01/23/19:     OVERNIGHT EVENTS:  Bronchoscopy.    Vital Signs Last 24 Hrs  T(C): 36.2 (23 Jan 2019 17:08), Max: 37.2 (22 Jan 2019 20:08)  T(F): 97.2 (23 Jan 2019 17:08), Max: 99 (22 Jan 2019 20:08)  HR: 107 (23 Jan 2019 17:08) (87 - 120)  BP: 102/45 (23 Jan 2019 17:08) (102/45 - 116/74)  BP(mean): --  RR: 17 (23 Jan 2019 17:08) (16 - 21)  SpO2: 97% (23 Jan 2019 17:08) (96% - 100%)    PHYSICAL EXAM:  GEN:         Awake, responsive and comfortable.  HEENT:    Normal.    RESP:        no distress.  CVS:             Regular rate and rhythm.   ABD:         Soft, non-tender, non-distended;   :             No costovertebral angle tenderness  EXTR:            No clubbing, cyanosis or edema  CNS:              Intact sensory and motor function.    MEDICATIONS  (STANDING):  meropenem  IVPB 1000 milliGRAM(s) IV Intermittent every 8 hours  nystatin Powder 1 Application(s) Topical every 12 hours  saccharomyces boulardii 250 milliGRAM(s) Oral two times a day  sodium chloride 0.45%. 1000 milliLiter(s) (100 mL/Hr) IV Continuous <Continuous>  vancomycin  IVPB 1000 milliGRAM(s) IV Intermittent every 12 hours    MEDICATIONS  (PRN):  loperamide 2 milliGRAM(s) Oral every 6 hours PRN Diarrhea  promethazine 25 milliGRAM(s) Oral three times a day PRN cough    LABS:                        13.2   21.90 )-----------( 345      ( 23 Jan 2019 06:34 )             40.5     01-23    138  |  105  |  17  ----------------------------<  77  4.0   |  24  |  1.00    Ca    9.0      23 Jan 2019 06:34    PT/INR - ( 23 Jan 2019 06:34 )   PT: 14.0 sec;   INR: 1.24 ratio      PTT - ( 23 Jan 2019 06:34 )  PTT:29.9 sec  01-19 @ 09:42  pH: 7.46  pCO2: 32  pO2: 93  SaO2: 97    ASSESSMENT AND PLAN:  ·	SOB with exertion.  ·	Multifocal lung infiltrates.  ·	Leukocytosis.  ·	High WBC, LDH, sedrate and Fungitell.(negative PIETER and RF).  ·	Negative HIV.    Follow bronchoscopy results.  on empiric antibiotics.

## 2019-01-23 NOTE — PROGRESS NOTE ADULT - SUBJECTIVE AND OBJECTIVE BOX
for Bronch Today    Vital Signs Last 24 Hrs  T(C): 36.6 (23 Jan 2019 05:27), Max: 37.5 (22 Jan 2019 11:32)  T(F): 97.8 (23 Jan 2019 05:27), Max: 99.5 (22 Jan 2019 11:32)  HR: 87 (23 Jan 2019 05:27) (87 - 118)  BP: 109/61 (23 Jan 2019 05:27) (105/67 - 109/67)  BP(mean): --  RR: 16 (23 Jan 2019 05:27) (16 - 16)  SpO2: 98% (23 Jan 2019 05:27) (96% - 98%)    PHYSICAL EXAM:    general - AAO x 3  HEENT - No Icterus  CVS - RRR  RS - AE B/L  Abd - soft, NT  Ext - Pulses +        LABS:                        13.2   21.90 )-----------( 345      ( 23 Jan 2019 06:34 )             40.5     01-23    138  |  105  |  17  ----------------------------<  77  4.0   |  24  |  1.00    Ca    9.0      23 Jan 2019 06:34      PT/INR - ( 23 Jan 2019 06:34 )   PT: 14.0 sec;   INR: 1.24 ratio         PTT - ( 23 Jan 2019 06:34 )  PTT:29.9 sec      Culture - Sputum (collected 21 Jan 2019 09:53)  Source: .Sputum Sputum  Gram Stain (prelim) (22 Jan 2019 09:20):    Rare polymorphonuclear leukocytes per low power field    Rare Squamous epithelial cells per low power field    Few Gram Positive Cocci in Clusters per oil power field    Few Gram Negative Rods per oil power field    Rare Gram Positive Rods per oil power field  Preliminary Report (22 Jan 2019 09:20):    Normal Respiratory Cece present    Culture - Sputum (collected 20 Jan 2019 02:06)  Source: .Sputum Sputum  Gram Stain (21 Jan 2019 19:13):    Rare polymorphonuclear leukocytes per low power field    Numerous Squamous epithelial cells per low power field    Moderate Gram Positive Cocci in Pairs and Chains seen per oil power field    Moderate Gram Positive Cocci in Clusters seen per oil power field    Moderate Gram Negative Rods seen per oil power field  Final Report (21 Jan 2019 19:13):    Normal Respiratory Cece present    Culture - Blood (collected 17 Jan 2019 00:25)  Source: .Blood Blood-Peripheral  Final Report (22 Jan 2019 01:01):    No growth at 5 days.    Culture - Blood (collected 17 Jan 2019 00:25)  Source: .Blood Blood-Peripheral  Final Report (22 Jan 2019 01:01):    No growth at 5 days.        RADIOLOGY & ADDITIONAL STUDIES:

## 2019-01-23 NOTE — DIETITIAN INITIAL EVALUATION ADULT. - PERTINENT MEDS FT
MEDICATIONS  (STANDING):  azithromycin   Tablet 500 milliGRAM(s) Oral daily  cefepime   IVPB 2000 milliGRAM(s) IV Intermittent every 8 hours  enoxaparin Injectable 40 milliGRAM(s) SubCutaneous daily  nystatin Powder 1 Application(s) Topical every 12 hours  saccharomyces boulardii 250 milliGRAM(s) Oral two times a day    MEDICATIONS  (PRN):  acetaminophen   Tablet .. 650 milliGRAM(s) Oral every 6 hours PRN Temp greater or equal to 38C (100.4F), Mild Pain (1 - 3)  loperamide 2 milliGRAM(s) Oral every 6 hours PRN Diarrhea

## 2019-01-24 LAB
ACE SERPL-CCNC: 43 U/L — SIGNIFICANT CHANGE UP (ref 14–82)
ANION GAP SERPL CALC-SCNC: 8 MMOL/L — SIGNIFICANT CHANGE UP (ref 5–17)
BUN SERPL-MCNC: 17 MG/DL — SIGNIFICANT CHANGE UP (ref 7–23)
CALCIUM SERPL-MCNC: 8.3 MG/DL — LOW (ref 8.5–10.1)
CHLORIDE SERPL-SCNC: 108 MMOL/L — SIGNIFICANT CHANGE UP (ref 96–108)
CO2 SERPL-SCNC: 23 MMOL/L — SIGNIFICANT CHANGE UP (ref 22–31)
CREAT SERPL-MCNC: 1.01 MG/DL — SIGNIFICANT CHANGE UP (ref 0.5–1.3)
CRP SERPL-MCNC: 3.72 MG/DL — HIGH (ref 0–0.4)
GLUCOSE SERPL-MCNC: 106 MG/DL — HIGH (ref 70–99)
GRAM STN FLD: SIGNIFICANT CHANGE UP
HCT VFR BLD CALC: 36.2 % — SIGNIFICANT CHANGE UP (ref 34.5–45)
HGB BLD-MCNC: 11.8 G/DL — SIGNIFICANT CHANGE UP (ref 11.5–15.5)
MAGNESIUM SERPL-MCNC: 2.1 MG/DL — SIGNIFICANT CHANGE UP (ref 1.6–2.6)
MCHC RBC-ENTMCNC: 28 PG — SIGNIFICANT CHANGE UP (ref 27–34)
MCHC RBC-ENTMCNC: 32.6 GM/DL — SIGNIFICANT CHANGE UP (ref 32–36)
MCV RBC AUTO: 86 FL — SIGNIFICANT CHANGE UP (ref 80–100)
NIGHT BLUE STAIN TISS: SIGNIFICANT CHANGE UP
NRBC # BLD: 0 /100 WBCS — SIGNIFICANT CHANGE UP (ref 0–0)
PHOSPHATE SERPL-MCNC: 2.7 MG/DL — SIGNIFICANT CHANGE UP (ref 2.5–4.5)
PLATELET # BLD AUTO: 340 K/UL — SIGNIFICANT CHANGE UP (ref 150–400)
POTASSIUM SERPL-MCNC: 3.7 MMOL/L — SIGNIFICANT CHANGE UP (ref 3.5–5.3)
POTASSIUM SERPL-SCNC: 3.7 MMOL/L — SIGNIFICANT CHANGE UP (ref 3.5–5.3)
RBC # BLD: 4.21 M/UL — SIGNIFICANT CHANGE UP (ref 3.8–5.2)
RBC # FLD: 11.9 % — SIGNIFICANT CHANGE UP (ref 10.3–14.5)
SODIUM SERPL-SCNC: 139 MMOL/L — SIGNIFICANT CHANGE UP (ref 135–145)
SPECIMEN SOURCE: SIGNIFICANT CHANGE UP
TM INTERPRETATION: SIGNIFICANT CHANGE UP
WBC # BLD: 26.32 K/UL — HIGH (ref 3.8–10.5)
WBC # FLD AUTO: 26.32 K/UL — HIGH (ref 3.8–10.5)

## 2019-01-24 PROCEDURE — 99233 SBSQ HOSP IP/OBS HIGH 50: CPT

## 2019-01-24 RX ADMIN — Medication 250 MILLIGRAM(S): at 05:16

## 2019-01-24 RX ADMIN — SODIUM CHLORIDE 100 MILLILITER(S): 9 INJECTION, SOLUTION INTRAVENOUS at 05:16

## 2019-01-24 RX ADMIN — Medication 650 MILLIGRAM(S): at 17:00

## 2019-01-24 RX ADMIN — MEROPENEM 100 MILLIGRAM(S): 1 INJECTION INTRAVENOUS at 05:16

## 2019-01-24 RX ADMIN — Medication 250 MILLIGRAM(S): at 17:05

## 2019-01-24 RX ADMIN — Medication 650 MILLIGRAM(S): at 17:50

## 2019-01-24 RX ADMIN — ENOXAPARIN SODIUM 40 MILLIGRAM(S): 100 INJECTION SUBCUTANEOUS at 11:49

## 2019-01-24 RX ADMIN — NYSTATIN CREAM 1 APPLICATION(S): 100000 CREAM TOPICAL at 05:16

## 2019-01-24 NOTE — PROGRESS NOTE ADULT - SUBJECTIVE AND OBJECTIVE BOX
Patient is a 38y old  Female who presents with a chief complaint of cold (24 Jan 2019 12:53)      INTERVAL HPI / OVERNIGHT EVENTS: breathing slightly better    MEDICATIONS  (STANDING):  acetaminophen   Tablet .. 650 milliGRAM(s) Oral once  enoxaparin Injectable 40 milliGRAM(s) SubCutaneous daily  nystatin Powder 1 Application(s) Topical every 12 hours  saccharomyces boulardii 250 milliGRAM(s) Oral two times a day  sodium chloride 0.45%. 1000 milliLiter(s) (100 mL/Hr) IV Continuous <Continuous>    MEDICATIONS  (PRN):  loperamide 2 milliGRAM(s) Oral every 6 hours PRN Diarrhea  promethazine 25 milliGRAM(s) Oral three times a day PRN cough      Vital Signs Last 24 Hrs  T(C): 37.1 (24 Jan 2019 12:40), Max: 37.1 (24 Jan 2019 12:40)  T(F): 98.7 (24 Jan 2019 12:40), Max: 98.7 (24 Jan 2019 12:40)  HR: 106 (24 Jan 2019 12:40) (88 - 116)  BP: 117/52 (24 Jan 2019 12:40) (94/52 - 117/52)  BP(mean): --  RR: 17 (24 Jan 2019 12:40) (16 - 21)  SpO2: 97% (24 Jan 2019 12:40) (95% - 100%)    Review of systems:  General : no fever /chills, fatigue  CVS : no chest pain, palpitations  Lungs : MIld shortness of breath, No cough  GI : no abdominal pain, vomiting, diarrhea   : no dysuria, hematuria        PHYSICAL EXAM:  General :NAD  Constitutional:  well-groomed, well-developed  Respiratory: CTAB/L  Cardiovascular: S1 and S2, RRR, no M/G/R  Gastrointestinal: BS+, soft, NT/ND  Extremities: No peripheral edema  Vascular: 2+ peripheral pulses  Skin: No rashes      LABS:                        11.8   26.32 )-----------( 340      ( 24 Jan 2019 08:12 )                 36.2     01-24    139  |  108  |  17  ----------------------------<  106<H>  3.7   |  23  |  1.01    Ca    8.3<L>      24 Jan 2019 08:12  Phos  2.7     01-24  Mg     2.1     01-24 1/21  auto neutrophils 22+%  Auto lymphocytes 65%            MICROBIOLOGY:  RECENT CULTURES:  01-24 .Broncial TRANSBRONCHIAL BIOPSY XXXX   No polymorphonuclear cells seen per low power field  No squamous epithelial cells seen per low power field  No organisms seen per oil power field XXXX    01-24 .Broncial BRONCHIAL WASHING WITH BRUSH TIP XXXX   No polymorphonuclear cells seen per low power field  No squamous epithelial cells seen per low power field  No organisms seen per oil power field   Testing in progress    01-24 .Broncial BRONCHIAL WASHING XXXX   Rare polymorphonuclear leukocytes seen per low power field  No squamous epithelial cells seen per low power field  No organisms seen per oil power field   Testing in progress    01-21 .Sputum Sputum XXXX   Rare polymorphonuclear leukocytes per low power field  Rare Squamous epithelial cells per low power field  Few Gram Positive Cocci in Clusters per oil power field  Few Gram Negative Rods per oil power field  Rare Gram Positive Rods per oil power field   Normal Respiratory Cece present    01-20 .Sputum Sputum XXXX   Rare polymorphonuclear leukocytes per low power field  Numerous Squamous epithelial cells per low power field  Moderate Gram Positive Cocci in Pairs and Chains seen per oil power field  Moderate Gram Positive Cocci in Clusters seen per oil power field  Moderate Gram Negative Rods seen per oil power field   Normal Respiratory Cece present          RADIOLOGY & ADDITIONAL STUDIES:

## 2019-01-24 NOTE — PROGRESS NOTE ADULT - PROBLEM SELECTOR PROBLEM 2
Leukocytosis, unspecified type
Leukocytosis, unspecified type
Sepsis, due to unspecified organism
Leukocytosis, unspecified type
Sepsis, due to unspecified organism

## 2019-01-24 NOTE — PROGRESS NOTE ADULT - SUBJECTIVE AND OBJECTIVE BOX
38M yo F with NO PMH who p/w pneumonia. She is lying in bed in NAD.    MEDICATIONS  (STANDING):  enoxaparin Injectable 40 milliGRAM(s) SubCutaneous daily  nystatin Powder 1 Application(s) Topical every 12 hours  saccharomyces boulardii 250 milliGRAM(s) Oral two times a day  sodium chloride 0.45%. 1000 milliLiter(s) (100 mL/Hr) IV Continuous <Continuous>    MEDICATIONS  (PRN):  loperamide 2 milliGRAM(s) Oral every 6 hours PRN Diarrhea  promethazine 25 milliGRAM(s) Oral three times a day PRN cough      Allergies    No Known Allergies    Intolerances        Vital Signs Last 24 Hrs  T(C): 36.8 (24 Jan 2019 18:35), Max: 38 (24 Jan 2019 18:02)  T(F): 98.2 (24 Jan 2019 18:35), Max: 100.4 (24 Jan 2019 18:02)  HR: 117 (24 Jan 2019 18:02) (88 - 117)  BP: 103/56 (24 Jan 2019 18:02) (94/52 - 117/52)  BP(mean): --  RR: 17 (24 Jan 2019 18:02) (16 - 17)  SpO2: 97% (24 Jan 2019 18:02) (95% - 99%)    PHYSICAL EXAM:  GENERAL: NAD, well-groomed, well-developed  HEAD:  Atraumatic, Normocephalic  EYES: EOMI, PERRLA   NECK: Supple   NERVOUS SYSTEM:  Alert & Oriented X3, Good concentration   CHEST/LUNG: Clear to auscultation bilaterally; No rales, rhonchi, wheezing, or rubs  HEART: Regular rate and rhythm; No murmurs, rubs, or gallops  ABDOMEN: Soft, Nontender, Nondistended; Bowel sounds present  EXTREMITIES: No clubbing, cyanosis, or edema    LABS:                        11.8   26.32 )-----------( 340      ( 24 Jan 2019 08:12 )             36.2     01-24    139  |  108  |  17  ----------------------------<  106<H>  3.7   |  23  |  1.01    Ca    8.3<L>      24 Jan 2019 08:12  Phos  2.7     01-24  Mg     2.1     01-24      PT/INR - ( 23 Jan 2019 06:34 )   PT: 14.0 sec;   INR: 1.24 ratio         PTT - ( 23 Jan 2019 06:34 )  PTT:29.9 sec    CAPILLARY BLOOD GLUCOSE          Culture - Acid Fast - Bronchial w/Smear (collected 24 Jan 2019 00:54)  Source: .Broncial TRANSBRONCHIAL BIOPSY    Culture - Bronchial (collected 24 Jan 2019 00:54)  Source: .Broncial TRANSBRONCHIAL BIOPSY  Gram Stain (prelim) (24 Jan 2019 17:43):    No polymorphonuclear cells seen per low power field    No squamous epithelial cells seen per low power field    No organisms seen per oil power field  Preliminary Report (24 Jan 2019 17:43):    No growth to date.    Culture - Fungal, Bronchial (collected 24 Jan 2019 00:48)  Source: .Broncial BRONCHIAL WASHING WITH BRUSH TIP  Preliminary Report (24 Jan 2019 08:16):    Testing in progress    Culture - Acid Fast - Bronchial w/Smear (collected 24 Jan 2019 00:48)  Source: .Broncial BRONCHIAL WASHING WITH BRUSH TIP    Culture - Bronchial (collected 24 Jan 2019 00:48)  Source: .Broncial BRONCHIAL WASHING WITH BRUSH TIP  Gram Stain (prelim) (24 Jan 2019 17:54):    No polymorphonuclear cells seen per low power field    No squamous epithelial cells seen per low power field    No organisms seen per oil power field  Preliminary Report (24 Jan 2019 17:54):    No growth to date.    Culture - Fungal, Bronchial (collected 24 Jan 2019 00:47)  Source: .Broncial BRONCHIAL WASHING  Preliminary Report (24 Jan 2019 08:16):    Testing in progress    Culture - Acid Fast - Bronchial w/Smear (collected 24 Jan 2019 00:47)  Source: .Broncial BRONCHIAL WASHING    Culture - Bronchial (collected 24 Jan 2019 00:47)  Source: .Broncial BRONCHIAL WASHING  Gram Stain (prelim) (24 Jan 2019 17:54):    Rare polymorphonuclear leukocytes seen per low power field    No squamous epithelial cells seen per low power field    No organisms seen per oil power field  Preliminary Report (24 Jan 2019 17:54):    No growth to date.    Culture - Sputum (collected 21 Jan 2019 09:53)  Source: .Sputum Sputum  Gram Stain (23 Jan 2019 11:10):    Rare polymorphonuclear leukocytes per low power field    Rare Squamous epithelial cells per low power field    Few Gram Positive Cocci in Clusters per oil power field    Few Gram Negative Rods per oil power field    Rare Gram Positive Rods per oil power field  Final Report (23 Jan 2019 11:10):    Normal Respiratory Cece present    Culture - Sputum (collected 20 Jan 2019 02:06)  Source: .Sputum Sputum  Gram Stain (21 Jan 2019 19:13):    Rare polymorphonuclear leukocytes per low power field    Numerous Squamous epithelial cells per low power field    Moderate Gram Positive Cocci in Pairs and Chains seen per oil power field    Moderate Gram Positive Cocci in Clusters seen per oil power field    Moderate Gram Negative Rods seen per oil power field  Final Report (21 Jan 2019 19:13):    Normal Respiratory Cece present      RADIOLOGY & ADDITIONAL TESTS:    01-23-19 @ 07:01  -  01-24-19 @ 07:00  --------------------------------------------------------  IN:    lactated ringers.: 100 mL    sodium chloride 0.45%.: 200 mL    Solution: 250 mL  Total IN: 550 mL    OUT:  Total OUT: 0 mL    Total NET: 550 mL      01-24-19 @ 07:01  -  01-24-19 @ 19:40  --------------------------------------------------------  IN:    Oral Fluid: 540 mL  Total IN: 540 mL    OUT:  Total OUT: 0 mL    Total NET: 540 mL

## 2019-01-24 NOTE — PROGRESS NOTE ADULT - PROBLEM SELECTOR PLAN 2
on differential found to have lymphocytossi with elevated CD3 and CD4   differentials  :NSLSC  hodgkin's lymphoma etc  flow cytometry pending  H/O on board  doubt that  at this point leukocytosis is from infection as it cont to worsens on strong antibiotics  d/c antibiotics today   f/u on bronchoscopy results

## 2019-01-24 NOTE — PROGRESS NOTE ADULT - SUBJECTIVE AND OBJECTIVE BOX
INTERVAL HPI:  38 years old female walked in c/o sob, dry cough bilateral chest pain and headache for two weeks. Cough is non productive,  associated  with some  pleuritic chest pain and short of breath. Went to urgent center and they gave her naproxen  Denied  recent long traveling or trauma, hx of taking hormone therapy, dizziness, blurred visions, light sensitivities, neck/back pain, focal/distal weakness or numbness, nausea, vomiting,, chills, abd pain, dysuria, hematuria, vaginal spotting or discharge or irregular bowel movements. Pt sts she is not at risk of being pregnant.  Denies smoking but reports 2nd hand exposure from her partner.   Admitted with multifocal pneumonia and leukocytosis.  01/23/19: Bronchoscopy    OVERNIGHT EVENTS:  Comfortable    Vital Signs Last 24 Hrs  T(C): 37.1 (24 Jan 2019 12:40), Max: 37.1 (24 Jan 2019 12:40)  T(F): 98.7 (24 Jan 2019 12:40), Max: 98.7 (24 Jan 2019 12:40)  HR: 106 (24 Jan 2019 12:40) (88 - 120)  BP: 117/52 (24 Jan 2019 12:40) (94/52 - 117/52)  BP(mean): --  RR: 17 (24 Jan 2019 12:40) (16 - 21)  SpO2: 97% (24 Jan 2019 12:40) (95% - 100%)    PHYSICAL EXAM:  GEN:         Awake, responsive and comfortable.  HEENT:    Normal.    RESP:       no wheezing.  CVS:             Regular rate and rhythm.   ABD:         Soft, non-tender, non-distended;     MEDICATIONS  (STANDING):  acetaminophen   Tablet .. 650 milliGRAM(s) Oral once  enoxaparin Injectable 40 milliGRAM(s) SubCutaneous daily  nystatin Powder 1 Application(s) Topical every 12 hours  saccharomyces boulardii 250 milliGRAM(s) Oral two times a day  sodium chloride 0.45%. 1000 milliLiter(s) (100 mL/Hr) IV Continuous <Continuous>    MEDICATIONS  (PRN):  loperamide 2 milliGRAM(s) Oral every 6 hours PRN Diarrhea  promethazine 25 milliGRAM(s) Oral three times a day PRN cough    LABS:                        11.8   26.32 )-----------( 340      ( 24 Jan 2019 08:12 )             36.2     01-24    139  |  108  |  17  ----------------------------<  106<H>  3.7   |  23  |  1.01    Ca    8.3<L>      24 Jan 2019 08:12  Phos  2.7     01-24  Mg     2.1     01-24    PT/INR - ( 23 Jan 2019 06:34 )   PT: 14.0 sec;   INR: 1.24 ratio      PTT - ( 23 Jan 2019 06:34 )  PTT:29.9 sec  01-19 @ 09:42  pH: 7.46  pCO2: 32  pO2: 93  SaO2: 97    ASSESSMENT AND PLAN:  ·	SOB with exertion.  ·	Multifocal lung infiltrates.  ·	Leukocytosis/Lymphocytosis  ·	High WBC, LDH, sedrate and Fungitell.(negative PIETER and RF).  ·	Negative HIV.    Follow bronchoscopy results.  Off empiric antibiotics.

## 2019-01-24 NOTE — PROGRESS NOTE ADULT - PROBLEM SELECTOR PLAN 3
enlarged ln on ct  pt says she has a strong family hx of leukemia   heme consult appreciated
enlarged ln on ct  pt says she has a strong family hx of leukemia  will get heme consult
resovled
c diff negative  can get imodium
enlarged ln on ct, pt says she has a strong family hx of leukemia   heme consult appreciated, elevated lymphocytes on diff noted

## 2019-01-24 NOTE — PROGRESS NOTE ADULT - SUBJECTIVE AND OBJECTIVE BOX
flow cytometry pending    Vital Signs Last 24 Hrs  T(C): 36.3 (24 Jan 2019 05:16), Max: 36.9 (23 Jan 2019 16:41)  T(F): 97.3 (24 Jan 2019 05:16), Max: 98.5 (23 Jan 2019 16:41)  HR: 88 (24 Jan 2019 05:16) (88 - 120)  BP: 94/52 (24 Jan 2019 05:16) (94/52 - 113/52)  BP(mean): --  RR: 16 (24 Jan 2019 05:16) (16 - 21)  SpO2: 99% (24 Jan 2019 05:16) (95% - 100%)    PHYSICAL EXAM:    general - AAO x 3  HEENT - No Icterus  CVS - RRR  RS - AE B/L  Abd - soft, NT  Ext - Pulses +        LABS:                        11.8   26.32 )-----------( 340      ( 24 Jan 2019 08:12 )             36.2     01-24    139  |  108  |  17  ----------------------------<  106<H>  3.7   |  23  |  1.01    Ca    8.3<L>      24 Jan 2019 08:12  Phos  2.7     01-24  Mg     2.1     01-24      PT/INR - ( 23 Jan 2019 06:34 )   PT: 14.0 sec;   INR: 1.24 ratio         PTT - ( 23 Jan 2019 06:34 )  PTT:29.9 sec      Culture - Bronchial (collected 24 Jan 2019 00:54)  Source: .Broncial TRANSBRONCHIAL BIOPSY  Gram Stain (24 Jan 2019 07:46):    No polymorphonuclear cells seen per low power field    No squamous epithelial cells seen per low power field    No organisms seen per oil power field    Culture - Fungal, Bronchial (collected 24 Jan 2019 00:48)  Source: .Broncial BRONCHIAL WASHING WITH BRUSH TIP  Preliminary Report (24 Jan 2019 08:16):    Testing in progress    Culture - Bronchial (collected 24 Jan 2019 00:48)  Source: .Broncial BRONCHIAL WASHING WITH BRUSH TIP  Gram Stain (24 Jan 2019 07:48):    No polymorphonuclear cells seen per low power field    No squamous epithelial cells seen per low power field    No organisms seen per oil power field    Culture - Fungal, Bronchial (collected 24 Jan 2019 00:47)  Source: .Broncial BRONCHIAL WASHING  Preliminary Report (24 Jan 2019 08:16):    Testing in progress    Culture - Bronchial (collected 24 Jan 2019 00:47)  Source: .Broncial BRONCHIAL WASHING  Gram Stain (24 Jan 2019 07:47):    Rare polymorphonuclear leukocytes seen per low power field    No squamous epithelial cells seen per low power field    No organisms seen per oil power field    Culture - Sputum (collected 21 Jan 2019 09:53)  Source: .Sputum Sputum  Gram Stain (23 Jan 2019 11:10):    Rare polymorphonuclear leukocytes per low power field    Rare Squamous epithelial cells per low power field    Few Gram Positive Cocci in Clusters per oil power field    Few Gram Negative Rods per oil power field    Rare Gram Positive Rods per oil power field  Final Report (23 Jan 2019 11:10):    Normal Respiratory Ecce present    Culture - Sputum (collected 20 Jan 2019 02:06)  Source: .Sputum Sputum  Gram Stain (21 Jan 2019 19:13):    Rare polymorphonuclear leukocytes per low power field    Numerous Squamous epithelial cells per low power field    Moderate Gram Positive Cocci in Pairs and Chains seen per oil power field    Moderate Gram Positive Cocci in Clusters seen per oil power field    Moderate Gram Negative Rods seen per oil power field  Final Report (21 Jan 2019 19:13):    Normal Respiratory Cece present    Culture - Blood (collected 17 Jan 2019 00:25)  Source: .Blood Blood-Peripheral  Final Report (22 Jan 2019 01:01):    No growth at 5 days.    Culture - Blood (collected 17 Jan 2019 00:25)  Source: .Blood Blood-Peripheral  Final Report (22 Jan 2019 01:01):    No growth at 5 days.        RADIOLOGY & ADDITIONAL STUDIES:

## 2019-01-24 NOTE — PROGRESS NOTE ADULT - SUBJECTIVE AND OBJECTIVE BOX
Postoperative Day # 1  s/p bronchoscopy    Patient seen and examined bedside resting comfortably.  No complaints offered.   Denies nausea and vomiting. Tolerating diet.  Flatus/BM. +  Denies chest pain, dyspnea, cough.    T(F): 97.3 (01-24-19 @ 05:16), Max: 98.6 (01-23-19 @ 11:34)  HR: 88 (01-24-19 @ 05:16) (88 - 120)  BP: 94/52 (01-24-19 @ 05:16) (94/52 - 116/74)  RR: 16 (01-24-19 @ 05:16) (16 - 21)  SpO2: 99% (01-24-19 @ 05:16) (95% - 100%)    PHYSICAL EXAM:  General: NAD  Neuro:  Alert & oriented x 3  Abdomen: soft, NTND. Normactive BS  Chest: clear to auscultation    LABS:                        11.8   26.32 )-----------( 340      ( 24 Jan 2019 08:12 )             36.2     01-24    139  |  108  |  17  ----------------------------<  106<H>  3.7   |  23  |  1.01    Ca    8.3<L>      24 Jan 2019 08:12  Phos  2.7     01-24  Mg     2.1     01-24    I&O's Detail    23 Jan 2019 07:01  -  24 Jan 2019 07:00  --------------------------------------------------------  IN:    lactated ringers.: 100 mL    sodium chloride 0.45%.: 200 mL    Solution: 250 mL  Total IN: 550 mL    OUT:  Total OUT: 0 mL    Total NET: 550 mL      Impression: 38y Female Postoperative Day # 1  s/p bronchoscopy  leukocytosis    Plan:  -continue VTE prophylaxis   - continue IVAB per ID  - continue medical f/u  - await C&S from bronchial washings  - no further thoracic intervention at this time.

## 2019-01-24 NOTE — PROGRESS NOTE ADULT - ASSESSMENT
38M yo F with NO PMH who p/w pneumonia.   - CT Angio Chest w/ IV Cont  on 1/16 showed no evidence for pulmonary embolism. There was patchy airspace opacifications in both lungs may represent pneumonia w/ a mildly enlarged right hilar lymph node. Follow up CT on 1/20 showed extensive bilateral lower lobe opacities  - due to gram-negative/positive bacteria w/ sepsis POA     - WBC going up  - fungitell elevated, legionella negative, streptococcal antigen neg  - ID & pulmonary on board  - Merrem & vanc stopped by ID  - Bronch today with brush biopsy - will check gram stain & culture, fungus culture, AFB smear and culture, PCP, and Cytology   - ACE level pending to evaluate for sarcoidosis given hilar adenopathy and poor response of WBC count to ABX   - heme/onc consulted for lymphocytosis as LN enlarged on ct, pt reported a strong family hx of leukemia and has elevated lymphocytes on diff -  flow cytometry done will Follow up w/ heme/onc  - C. diff negative  - HIV negative  - , CRP 2.29   - RF & PIETER neg  - procalcitonin normal    Prophylaxis:  DVT: Lovenox  GI: PO diet

## 2019-01-25 ENCOUNTER — TRANSCRIPTION ENCOUNTER (OUTPATIENT)
Age: 39
End: 2019-01-25

## 2019-01-25 VITALS
RESPIRATION RATE: 18 BRPM | DIASTOLIC BLOOD PRESSURE: 64 MMHG | OXYGEN SATURATION: 99 % | HEART RATE: 95 BPM | TEMPERATURE: 99 F | SYSTOLIC BLOOD PRESSURE: 118 MMHG

## 2019-01-25 LAB
BASOPHILS # BLD AUTO: 0 K/UL — SIGNIFICANT CHANGE UP (ref 0–0.2)
BASOPHILS NFR BLD AUTO: 0 % — SIGNIFICANT CHANGE UP (ref 0–2)
CRP SERPL-MCNC: 2.65 MG/DL — HIGH (ref 0–0.4)
CULTURE RESULTS: SIGNIFICANT CHANGE UP
EOSINOPHIL # BLD AUTO: 0.2 K/UL — SIGNIFICANT CHANGE UP (ref 0–0.5)
EOSINOPHIL NFR BLD AUTO: 1 % — SIGNIFICANT CHANGE UP (ref 0–6)
ERYTHROCYTE [SEDIMENTATION RATE] IN BLOOD: 81 MM/HR — HIGH (ref 0–15)
GRAM STN FLD: SIGNIFICANT CHANGE UP
HCT VFR BLD CALC: 36.3 % — SIGNIFICANT CHANGE UP (ref 34.5–45)
HGB BLD-MCNC: 12 G/DL — SIGNIFICANT CHANGE UP (ref 11.5–15.5)
LYMPHOCYTES # BLD AUTO: 12.37 K/UL — HIGH (ref 1–3.3)
LYMPHOCYTES # BLD AUTO: 61 % — HIGH (ref 13–44)
MANUAL SMEAR VERIFICATION: SIGNIFICANT CHANGE UP
MCHC RBC-ENTMCNC: 27.8 PG — SIGNIFICANT CHANGE UP (ref 27–34)
MCHC RBC-ENTMCNC: 33.1 GM/DL — SIGNIFICANT CHANGE UP (ref 32–36)
MCV RBC AUTO: 84.2 FL — SIGNIFICANT CHANGE UP (ref 80–100)
MICROCYTES BLD QL: SLIGHT — SIGNIFICANT CHANGE UP
MONOCYTES # BLD AUTO: 1.83 K/UL — HIGH (ref 0–0.9)
MONOCYTES NFR BLD AUTO: 9 % — SIGNIFICANT CHANGE UP (ref 2–14)
NEUTROPHILS # BLD AUTO: 5.88 K/UL — SIGNIFICANT CHANGE UP (ref 1.8–7.4)
NEUTROPHILS NFR BLD AUTO: 29 % — LOW (ref 43–77)
NON-GYNECOLOGICAL CYTOLOGY STUDY: SIGNIFICANT CHANGE UP
NRBC # BLD: 0 /100 — SIGNIFICANT CHANGE UP (ref 0–0)
NRBC # BLD: SIGNIFICANT CHANGE UP /100 WBCS (ref 0–0)
PLAT MORPH BLD: NORMAL — SIGNIFICANT CHANGE UP
PLATELET # BLD AUTO: 361 K/UL — SIGNIFICANT CHANGE UP (ref 150–400)
RBC # BLD: 4.31 M/UL — SIGNIFICANT CHANGE UP (ref 3.8–5.2)
RBC # FLD: 12 % — SIGNIFICANT CHANGE UP (ref 10.3–14.5)
RBC BLD AUTO: SIGNIFICANT CHANGE UP
SPECIMEN SOURCE: SIGNIFICANT CHANGE UP
WBC # BLD: 20.28 K/UL — HIGH (ref 3.8–10.5)
WBC # FLD AUTO: 20.28 K/UL — HIGH (ref 3.8–10.5)

## 2019-01-25 PROCEDURE — 99239 HOSP IP/OBS DSCHRG MGMT >30: CPT

## 2019-01-25 PROCEDURE — 71045 X-RAY EXAM CHEST 1 VIEW: CPT | Mod: 26

## 2019-01-25 RX ADMIN — SODIUM CHLORIDE 100 MILLILITER(S): 9 INJECTION, SOLUTION INTRAVENOUS at 05:35

## 2019-01-25 RX ADMIN — NYSTATIN CREAM 1 APPLICATION(S): 100000 CREAM TOPICAL at 05:34

## 2019-01-25 RX ADMIN — Medication 250 MILLIGRAM(S): at 05:34

## 2019-01-25 NOTE — DISCHARGE NOTE ADULT - ADDITIONAL INSTRUCTIONS
Follow up w/ Dr. Cedeno () at [facility] Follow up w/ Dr. Cedeno () at [facility]  Follow up w/ Dr. Love about bronchoscopy results & a follow up Chest CT in 6-8 week

## 2019-01-25 NOTE — DISCHARGE NOTE ADULT - PLAN OF CARE
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

## 2019-01-25 NOTE — DISCHARGE NOTE ADULT - CARE PLAN
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  Secondary Diagnosis:	Leukocytosis, unspecified type

## 2019-01-25 NOTE — DISCHARGE NOTE ADULT - CARE PROVIDERS DIRECT ADDRESSES
[email or web address]

## 2019-01-25 NOTE — DISCHARGE NOTE ADULT - HOSPITAL COURSE
38M yo F with NO PMH who p/w cough and leukocytosis. CT Angio Chest w/ IV Cont  on 1/16 showed no evidence for pulmonary embolism. There was patchy airspace opacifications in both lungs may have represented pneumonia w/ a mildly enlarged right hilar lymph node. Follow up CT on 1/20 showed extensive bilateral lower lobe opacities. Pt was initially treated for pneumonia. Fungitell was elevated but legionella & streptococcal antigens were negative. Bronch with brush biopsy were done with results pending. Heme/onc were consulted for lymphocytosis as there was an enlarged LN on CT. Patient reported a strong family hx of leukemia and had elevated lymphocytes on diff. Heme/onc showed flow cytometry c/w  T - Cell Lymphoproliferative disorder. At this point, PNA was deemed unlikely by ID and Dr. Brandi Martinezrem & Vanc. Hematology referred her to Dr. Cedeno () at [facility]. She will also Follow up w/ pulmonary about bronchoscopy results & a follow up Chest CT in 6-8 weeks. She has been cleared for discharge by Dr. Love.     Discharge time: 43 minutes 38M yo F with NO PMH who p/w cough and leukocytosis. CT Angio Chest w/ IV Cont  on 1/16 showed no evidence for pulmonary embolism. There was patchy airspace opacifications in both lungs may have represented pneumonia w/ a mildly enlarged right hilar lymph node. Follow up CT on 1/20 showed extensive bilateral lower lobe opacities. Pt was initially treated for pneumonia. Fungitell was elevated but legionella & streptococcal antigens were negative. Bronch with brush biopsy were done with results pending. Heme/onc were consulted for lymphocytosis as there was an enlarged LN on CT. Patient reported a strong family hx of leukemia and had elevated lymphocytes on diff. Heme/onc showed flow cytometry c/w  T - Cell Lymphoproliferative disorder. At this point, PNA was deemed unlikely by ID and Dr. Brandi Martinezrem & Vanc. Hematology referred her to Dr. Cedeno () at [facility]. She will also Follow up w/ pulmonary about bronchoscopy results & a follow up Chest CT in 6-8 weeks. She has been cleared for discharge by Dr. Love.     Discharge time: 43 minutes

## 2019-01-25 NOTE — DISCHARGE NOTE ADULT - CARE PROVIDER_API CALL
Mitesh Kaur), Internal Medicine  [address]  [address]  [place] [address]  Phone: [phone]  Fax: [phone]    Scarlet Love), Medicine  [address]  [address]  [place] [address]  Phone: [phone]  Fax: [phone]

## 2019-01-25 NOTE — PROGRESS NOTE ADULT - PROVIDER SPECIALTY LIST ADULT
Heme/Onc
Hospitalist
Infectious Disease
Pulmonology
Thoracic Surgery
Pulmonology
Pulmonology

## 2019-01-25 NOTE — PROGRESS NOTE ADULT - PROBLEM SELECTOR PLAN 1
- Flow Cytometry shows evidence of T- Cell Lymphoproliferative disorder, d/w Patient about finding, she is aware she was given the Name and Number of Physician At [facility] [address] Cedeno [phone], patient will follow up as soon as possible after discharge  - Abx per ID  - f/u Bronch Results

## 2019-01-25 NOTE — DISCHARGE NOTE ADULT - INSTRUCTIONS
Pt verbalized understanding of discharge instructions, medications, scheduling f/u appts with MD.  Written instructions given

## 2019-01-25 NOTE — PROGRESS NOTE ADULT - SUBJECTIVE AND OBJECTIVE BOX
Postoperative Day # 2  s/p bronchoscopy    Patient seen and examined bedside resting comfortably.  No complaints offered. States she feels better.  Denies nausea and vomiting. Tolerating diet.  Flatus/BM. +  Denies chest pain, dyspnea, cough.    T(F): 98 (01-25-19 @ 05:49), Max: 100.4 (01-24-19 @ 18:02)  HR: 87 (01-25-19 @ 05:49) (87 - 117)  BP: 103/60 (01-25-19 @ 05:49) (99/53 - 117/52)  RR: 16 (01-25-19 @ 05:49) (16 - 17)  SpO2: 97% (01-25-19 @ 05:49) (96% - 97%)    PHYSICAL EXAM:  General: NAD  Neuro:  Alert & oriented x 3  Abdomen: soft, NTND. Normactive BS    LABS:                        12.0   20.28 )-----------( 361      ( 25 Jan 2019 07:23 )             36.3     Culture - Fungal, Bronchial (01.24.19 @ 00:54)    Specimen Source: .Broncial TRANSBRONCHIAL BIOPSY    Culture Results:   Testing in progress    Culture - Acid Fast - Bronchial w/Smear (01.24.19 @ 00:54)    Specimen Source: .Broncial TRANSBRONCHIAL BIOPSY    Acid Fast Bacilli Smear:   No acid fast bacilli seen by fluorochrome stain    Culture - Bronchial (01.24.19 @ 00:54)    Gram Stain:   No polymorphonuclear cells seen per low power field  No squamous epithelial cells seen per low power field  No organisms seen per oil power field    Specimen Source: .Broncial TRANSBRONCHIAL BIOPSY    Culture Results:   No growth to date.    I&O's Detail    Culture - Bronchial (01.24.19 @ 00:54)    Gram Stain:   No polymorphonuclear cells seen per low power field  No squamous epithelial cells seen per low power field  No organisms seen per oil power field    Specimen Source: .Broncial TRANSBRONCHIAL BIOPSY    Culture Results:   No growth to date.    24 Jan 2019 07:01  -  25 Jan 2019 07:00  --------------------------------------------------------  IN:    Oral Fluid: 540 mL    sodium chloride 0.45%.: 1200 mL  Total IN: 1740 mL    OUT:  Total OUT: 0 mL    Total NET: 1740 mL      25 Jan 2019 07:01  -  25 Jan 2019 11:04  --------------------------------------------------------  IN:    Oral Fluid: 300 mL  Total IN: 300 mL    OUT:  Total OUT: 0 mL    Total NET: 300 mL    Impression: 38y Female Postoperative Day # 2  s/p bronchoscopy    Plan:  - continue medical & pulmonary f/u  -will discuss with Dr. Carlton

## 2019-01-25 NOTE — PROGRESS NOTE ADULT - REASON FOR ADMISSION
cold
Pneumonia
cold

## 2019-01-25 NOTE — DISCHARGE NOTE ADULT - CONDITIONS AT DISCHARGE
Pt A & O x 4, resps unlabored, denies discomfort.  Pt evaluated by Dr Littlejohn, pt is stable for discharge.

## 2019-01-25 NOTE — PROGRESS NOTE ADULT - SUBJECTIVE AND OBJECTIVE BOX
Flow Cytometry - shows presence of T - Cell Lymphoproliferative disorder    Vital Signs Last 24 Hrs  T(C): 36.7 (25 Jan 2019 05:49), Max: 38 (24 Jan 2019 18:02)  T(F): 98 (25 Jan 2019 05:49), Max: 100.4 (24 Jan 2019 18:02)  HR: 87 (25 Jan 2019 05:49) (87 - 117)  BP: 103/60 (25 Jan 2019 05:49) (99/53 - 117/52)  BP(mean): --  RR: 16 (25 Jan 2019 05:49) (16 - 17)  SpO2: 97% (25 Jan 2019 05:49) (96% - 97%)    PHYSICAL EXAM:    general - AAO x 3  HEENT - No Icterus  CVS - RRR  RS - AE B/L  Abd - soft, NT  Ext - Pulses +        LABS:                        12.0   20.28 )-----------( 361      ( 25 Jan 2019 07:23 )             36.3     01-24    139  |  108  |  17  ----------------------------<  106<H>  3.7   |  23  |  1.01    Ca    8.3<L>      24 Jan 2019 08:12  Phos  2.7     01-24  Mg     2.1     01-24            Culture - Fungal, Bronchial (collected 24 Jan 2019 00:54)  Source: .Broncial TRANSBRONCHIAL BIOPSY  Preliminary Report (25 Jan 2019 06:25):    Testing in progress    Culture - Acid Fast - Bronchial w/Smear (collected 24 Jan 2019 00:54)  Source: .Broncial TRANSBRONCHIAL BIOPSY    Culture - Bronchial (collected 24 Jan 2019 00:54)  Source: .Broncial TRANSBRONCHIAL BIOPSY  Gram Stain (prelim) (24 Jan 2019 17:43):    No polymorphonuclear cells seen per low power field    No squamous epithelial cells seen per low power field    No organisms seen per oil power field  Preliminary Report (24 Jan 2019 17:43):    No growth to date.    Culture - Fungal, Bronchial (collected 24 Jan 2019 00:48)  Source: .Broncial BRONCHIAL WASHING WITH BRUSH TIP  Preliminary Report (24 Jan 2019 08:16):    Testing in progress    Culture - Acid Fast - Bronchial w/Smear (collected 24 Jan 2019 00:48)  Source: .Broncial BRONCHIAL WASHING WITH BRUSH TIP    Culture - Bronchial (collected 24 Jan 2019 00:48)  Source: .Broncial BRONCHIAL WASHING WITH BRUSH TIP  Gram Stain (prelim) (24 Jan 2019 17:54):    No polymorphonuclear cells seen per low power field    No squamous epithelial cells seen per low power field    No organisms seen per oil power field  Preliminary Report (24 Jan 2019 17:54):    No growth to date.    Culture - Fungal, Bronchial (collected 24 Jan 2019 00:47)  Source: .Broncial BRONCHIAL WASHING  Preliminary Report (24 Jan 2019 08:16):    Testing in progress    Culture - Acid Fast - Bronchial w/Smear (collected 24 Jan 2019 00:47)  Source: .Broncial BRONCHIAL WASHING    Culture - Bronchial (collected 24 Jan 2019 00:47)  Source: .Broncial BRONCHIAL WASHING  Gram Stain (prelim) (24 Jan 2019 17:54):    Rare polymorphonuclear leukocytes seen per low power field    No squamous epithelial cells seen per low power field    No organisms seen per oil power field  Preliminary Report (24 Jan 2019 17:54):    No growth to date.    Culture - Sputum (collected 21 Jan 2019 09:53)  Source: .Sputum Sputum  Gram Stain (23 Jan 2019 11:10):    Rare polymorphonuclear leukocytes per low power field    Rare Squamous epithelial cells per low power field    Few Gram Positive Cocci in Clusters per oil power field    Few Gram Negative Rods per oil power field    Rare Gram Positive Rods per oil power field  Final Report (23 Jan 2019 11:10):    Normal Respiratory Cece present    Culture - Sputum (collected 20 Jan 2019 02:06)  Source: .Sputum Sputum  Gram Stain (21 Jan 2019 19:13):    Rare polymorphonuclear leukocytes per low power field    Numerous Squamous epithelial cells per low power field    Moderate Gram Positive Cocci in Pairs and Chains seen per oil power field    Moderate Gram Positive Cocci in Clusters seen per oil power field    Moderate Gram Negative Rods seen per oil power field  Final Report (21 Jan 2019 19:13):    Normal Respiratory Cece present    Culture - Blood (collected 17 Jan 2019 00:25)  Source: .Blood Blood-Peripheral  Final Report (22 Jan 2019 01:01):    No growth at 5 days.    Culture - Blood (collected 17 Jan 2019 00:25)  Source: .Blood Blood-Peripheral  Final Report (22 Jan 2019 01:01):    No growth at 5 days.        RADIOLOGY & ADDITIONAL STUDIES:

## 2019-01-25 NOTE — CHART NOTE - NSCHARTNOTEFT_GEN_A_CORE
To whom it may concern:     APRIL QUAN has been under inpatient care at [facility] since 01-16-19. She has been discharged from the hospital today, 1-25-19.    Sincerely,  Antoni Littlejohn MD

## 2019-01-25 NOTE — PROGRESS NOTE ADULT - PROBLEM SELECTOR PROBLEM 1
Leukocytosis, unspecified type
Lymphocytosis
Pneumonia of both lungs due to other aerobic gram-negative bacteria

## 2019-01-25 NOTE — DISCHARGE NOTE ADULT - PATIENT PORTAL LINK FT
You can access the [facility] Patient Portal, offered by [facility], by registering with the following website: [email or web address]

## 2019-01-26 PROBLEM — Z00.00 ENCOUNTER FOR PREVENTIVE HEALTH EXAMINATION: Status: ACTIVE | Noted: 2019-01-26

## 2019-02-11 ENCOUNTER — TRANSCRIPTION ENCOUNTER (OUTPATIENT)
Age: 39
End: 2019-02-11

## 2019-02-11 ENCOUNTER — OUTPATIENT (OUTPATIENT)
Dept: OUTPATIENT SERVICES | Facility: HOSPITAL | Age: 39
LOS: 1 days | Discharge: HOME | End: 2019-02-11

## 2019-02-11 DIAGNOSIS — D64.9 ANEMIA, UNSPECIFIED: ICD-10-CM

## 2019-02-20 ENCOUNTER — APPOINTMENT (OUTPATIENT)
Dept: HEMATOLOGY ONCOLOGY | Facility: CLINIC | Age: 39
End: 2019-02-20

## 2019-02-23 LAB
CULTURE RESULTS: SIGNIFICANT CHANGE UP
SPECIMEN SOURCE: SIGNIFICANT CHANGE UP

## 2019-03-01 ENCOUNTER — TRANSCRIPTION ENCOUNTER (OUTPATIENT)
Age: 39
End: 2019-03-01

## 2019-03-16 LAB
CULTURE RESULTS: SIGNIFICANT CHANGE UP
NIGHT BLUE STAIN TISS: SIGNIFICANT CHANGE UP
SPECIMEN SOURCE: SIGNIFICANT CHANGE UP

## 2021-07-08 NOTE — PROGRESS NOTE ADULT - SUBJECTIVE AND OBJECTIVE BOX
INTERVAL HPI:  38 years old female walked in c/o sob, dry cough bilateral chest pain and headache for two weeks. Cough is non productive,  associated  with some  pleuritic chest pain and short of breath. Went to urgent center and they gave her naproxen  Denied  recent long traveling or trauma, hx of taking hormone therapy, dizziness, blurred visions, light sensitivities, neck/back pain, focal/distal weakness or numbness, nausea, vomiting,, chills, abd pain, dysuria, hematuria, vaginal spotting or discharge or irregular bowel movements. Pt sts she is not at risk of being pregnant.  Denies smoking but reports 2nd hand exposure from her partner.   Admitted with multifocal pneumonia and leukocytosis.  19: Bronchoscopy    OVERNIGHT EVENTS:  Awake and comfortable.    Vital Signs Last 24 Hrs  T(C): 36.7 (2019 05:49), Max: 38 (2019 18:02)  T(F): 98 (2019 05:49), Max: 100.4 (2019 18:02)  HR: 87 (2019 05:49) (87 - 117)  BP: 103/60 (2019 05:49) (99/53 - 103/60)  BP(mean): --  RR: 16 (2019 05:49) (16 - 17)  SpO2: 97% (2019 05:49) (96% - 97%)    PHYSICAL EXAM:  GEN:         Awake, responsive and comfortable.  HEENT:    Normal.    RESP:        no wheezing.  CVS:             Regular rate and rhythm.   ABD:         Soft, non-tender, non-distended;     MEDICATIONS  (STANDING):  enoxaparin Injectable 40 milliGRAM(s) SubCutaneous daily  nystatin Powder 1 Application(s) Topical every 12 hours  saccharomyces boulardii 250 milliGRAM(s) Oral two times a day  sodium chloride 0.45%. 1000 milliLiter(s) (100 mL/Hr) IV Continuous <Continuous>    MEDICATIONS  (PRN):  loperamide 2 milliGRAM(s) Oral every 6 hours PRN Diarrhea  promethazine 25 milliGRAM(s) Oral three times a day PRN cough    LABS:                        12.0   20.28 )-----------( 361      ( 2019 07:23 )             36.3         139  |  108  |  17  ----------------------------<  106<H>  3.7   |  23  |  1.01    Ca    8.3<L>      2019 08:12  Phos  2.7       Mg     2.1      @ 09:42  pH: 7.46  pCO2: 32  pO2: 93  SaO2: 97    ASSESSMENT AND PLAN:  ·	SOB with exertion.  ·	Multifocal lung infiltrates.  ·	Leukocytosis/Lymphocytosis  ·	High WBC, LDH, sedrate and Fungitell.(negative PIETER and RF).  ·	Negative HIV.  ·	Flow Cytometry - shows presence of T - Cell Lymphoproliferative disorder    Flow Cytometry results seen. Hematology referred her to Dr. Cedeno () at [facility]. Pt reports that her mom was diagnosed with Leukemia and  at age of 52.  Pt was born in [place] where HTV1 virus is common and is associated with T cell Leukemia/Lymphoma.  Pulmonary status is stable at this point, Bronchoscopy results need to be followed.  She with need a follow up Chest CT in 6-8 weeks. Agrees to follow in office. Attending with

## 2025-01-23 NOTE — CONSULT NOTE ADULT - PROBLEM SELECTOR RECOMMENDATION 9
no improvement so far per pt   change to cefepime  and azithro   send fungitell, legionella, streptococcal antigen  check HIV ,pt consented
- repeat CBC with Differential  - repeat LDH  - will send off flow cytometry  - CT A/P with PO and IV contrast  - Positive Fungitell - ID follow up  - Abx per ID
Yes

## 2025-06-02 NOTE — PATIENT PROFILE ADULT - NSPROEDALEARNPREF_GEN_A_NUR
Telephone call to Hayde Saavedra [phone] -daughter to confirm if they are STILL declining home care services; and she confirmed that they do not want the services; 
written material/skill demonstration/verbal instruction